# Patient Record
Sex: MALE | Race: WHITE | Employment: OTHER | URBAN - METROPOLITAN AREA
[De-identification: names, ages, dates, MRNs, and addresses within clinical notes are randomized per-mention and may not be internally consistent; named-entity substitution may affect disease eponyms.]

---

## 2019-12-18 PROBLEM — G47.33 OBSTRUCTIVE SLEEP APNEA: Status: ACTIVE | Noted: 2019-12-18

## 2020-03-06 ENCOUNTER — HOSPITAL ENCOUNTER (OUTPATIENT)
Dept: INFUSION THERAPY | Age: 65
Discharge: HOME | End: 2020-03-06
Payer: MEDICARE

## 2020-03-06 LAB
ALBUMIN SERPL-MCNC: 3.9 GM/DL (ref 3.4–5)
ALP BLD-CCNC: 62 IU/L (ref 40–129)
ALT SERPL-CCNC: 11 U/L (ref 10–40)
ANION GAP SERPL CALCULATED.3IONS-SCNC: 9 MMOL/L (ref 4–16)
AST SERPL-CCNC: 16 IU/L (ref 15–37)
BASOPHILS ABSOLUTE: 0 K/CU MM
BASOPHILS RELATIVE PERCENT: 0.7 % (ref 0–1)
BILIRUB SERPL-MCNC: 0.3 MG/DL (ref 0–1)
BUN BLDV-MCNC: 17 MG/DL (ref 6–23)
CALCIUM SERPL-MCNC: 8.9 MG/DL (ref 8.3–10.6)
CHLORIDE BLD-SCNC: 106 MMOL/L (ref 99–110)
CO2: 28 MMOL/L (ref 21–32)
CREAT SERPL-MCNC: 1.1 MG/DL (ref 0.9–1.3)
D DIMER: <200 NG/ML(DDU)
DIFFERENTIAL TYPE: ABNORMAL
EOSINOPHILS ABSOLUTE: 0.3 K/CU MM
EOSINOPHILS RELATIVE PERCENT: 4.8 % (ref 0–3)
GFR AFRICAN AMERICAN: >60 ML/MIN/1.73M2
GFR NON-AFRICAN AMERICAN: >60 ML/MIN/1.73M2
GLUCOSE BLD-MCNC: 73 MG/DL (ref 70–99)
HCT VFR BLD CALC: 43.2 % (ref 42–52)
HEMOGLOBIN: 14.3 GM/DL (ref 13.5–18)
LYMPHOCYTES ABSOLUTE: 1.4 K/CU MM
LYMPHOCYTES RELATIVE PERCENT: 25.6 % (ref 24–44)
MCH RBC QN AUTO: 29.4 PG (ref 27–31)
MCHC RBC AUTO-ENTMCNC: 33.1 % (ref 32–36)
MCV RBC AUTO: 88.9 FL (ref 78–100)
MONOCYTES ABSOLUTE: 0.6 K/CU MM
MONOCYTES RELATIVE PERCENT: 10.6 % (ref 0–4)
PDW BLD-RTO: 13.4 % (ref 11.7–14.9)
PLATELET # BLD: 202 K/CU MM (ref 140–440)
PMV BLD AUTO: 9.7 FL (ref 7.5–11.1)
POTASSIUM SERPL-SCNC: 4.7 MMOL/L (ref 3.5–5.1)
RBC # BLD: 4.86 M/CU MM (ref 4.6–6.2)
SEGMENTED NEUTROPHILS ABSOLUTE COUNT: 3.2 K/CU MM
SEGMENTED NEUTROPHILS RELATIVE PERCENT: 58.3 % (ref 36–66)
SODIUM BLD-SCNC: 143 MMOL/L (ref 135–145)
TOTAL PROTEIN: 6.7 GM/DL (ref 6.4–8.2)
WBC # BLD: 5.5 K/CU MM (ref 4–10.5)

## 2020-03-06 PROCEDURE — 36415 COLL VENOUS BLD VENIPUNCTURE: CPT

## 2020-03-06 PROCEDURE — 80053 COMPREHEN METABOLIC PANEL: CPT

## 2020-03-06 PROCEDURE — 85379 FIBRIN DEGRADATION QUANT: CPT

## 2020-03-06 PROCEDURE — 85025 COMPLETE CBC W/AUTO DIFF WBC: CPT

## 2020-06-21 ENCOUNTER — HOSPITAL ENCOUNTER (EMERGENCY)
Age: 65
Discharge: HOME | End: 2020-06-21
Payer: MEDICARE

## 2020-06-21 ENCOUNTER — APPOINTMENT (OUTPATIENT)
Dept: CT IMAGING | Age: 65
End: 2020-06-21
Payer: MEDICARE

## 2020-06-21 VITALS
TEMPERATURE: 98.4 F | SYSTOLIC BLOOD PRESSURE: 131 MMHG | HEIGHT: 73 IN | RESPIRATION RATE: 16 BRPM | OXYGEN SATURATION: 96 % | WEIGHT: 270 LBS | DIASTOLIC BLOOD PRESSURE: 86 MMHG | HEART RATE: 69 BPM | BODY MASS INDEX: 35.78 KG/M2

## 2020-06-21 LAB
ALBUMIN SERPL-MCNC: 4.1 GM/DL (ref 3.4–5)
ALP BLD-CCNC: 58 IU/L (ref 40–129)
ALT SERPL-CCNC: 19 U/L (ref 10–40)
ANION GAP SERPL CALCULATED.3IONS-SCNC: 9 MMOL/L (ref 4–16)
AST SERPL-CCNC: 25 IU/L (ref 15–37)
BACTERIA: NEGATIVE /HPF
BASOPHILS ABSOLUTE: 0.1 K/CU MM
BASOPHILS RELATIVE PERCENT: 0.8 % (ref 0–1)
BILIRUB SERPL-MCNC: 0.3 MG/DL (ref 0–1)
BILIRUBIN URINE: NEGATIVE MG/DL
BLOOD, URINE: NEGATIVE
BUN BLDV-MCNC: 26 MG/DL (ref 6–23)
CALCIUM SERPL-MCNC: 9.3 MG/DL (ref 8.3–10.6)
CHLORIDE BLD-SCNC: 104 MMOL/L (ref 99–110)
CLARITY: CLEAR
CO2: 26 MMOL/L (ref 21–32)
COLOR: YELLOW
CREAT SERPL-MCNC: 1.2 MG/DL (ref 0.9–1.3)
DIFFERENTIAL TYPE: ABNORMAL
EOSINOPHILS ABSOLUTE: 0.3 K/CU MM
EOSINOPHILS RELATIVE PERCENT: 4.7 % (ref 0–3)
GFR AFRICAN AMERICAN: >60 ML/MIN/1.73M2
GFR NON-AFRICAN AMERICAN: >60 ML/MIN/1.73M2
GLUCOSE BLD-MCNC: 99 MG/DL (ref 70–99)
GLUCOSE, URINE: NEGATIVE MG/DL
HCT VFR BLD CALC: 42 % (ref 42–52)
HEMOGLOBIN: 13.5 GM/DL (ref 13.5–18)
IMMATURE NEUTROPHIL %: 0.2 % (ref 0–0.43)
KETONES, URINE: NEGATIVE MG/DL
LACTATE: 0.9 MMOL/L (ref 0.4–2)
LEUKOCYTE ESTERASE, URINE: NEGATIVE
LIPASE: 46 IU/L (ref 13–60)
LYMPHOCYTES ABSOLUTE: 2 K/CU MM
LYMPHOCYTES RELATIVE PERCENT: 32 % (ref 24–44)
MCH RBC QN AUTO: 29.9 PG (ref 27–31)
MCHC RBC AUTO-ENTMCNC: 32.1 % (ref 32–36)
MCV RBC AUTO: 92.9 FL (ref 78–100)
MONOCYTES ABSOLUTE: 0.7 K/CU MM
MONOCYTES RELATIVE PERCENT: 11.8 % (ref 0–4)
NITRITE URINE, QUANTITATIVE: NEGATIVE
NUCLEATED RBC %: 0 %
PDW BLD-RTO: 13.2 % (ref 11.7–14.9)
PH, URINE: 5 (ref 5–8)
PLATELET # BLD: 200 K/CU MM (ref 140–440)
PMV BLD AUTO: 10 FL (ref 7.5–11.1)
POTASSIUM SERPL-SCNC: 4 MMOL/L (ref 3.5–5.1)
PROTEIN UA: NEGATIVE MG/DL
RBC # BLD: 4.52 M/CU MM (ref 4.6–6.2)
RBC URINE: NORMAL /HPF (ref 0–3)
SEGMENTED NEUTROPHILS ABSOLUTE COUNT: 3.1 K/CU MM
SEGMENTED NEUTROPHILS RELATIVE PERCENT: 50.5 % (ref 36–66)
SODIUM BLD-SCNC: 139 MMOL/L (ref 135–145)
SPECIFIC GRAVITY UA: 1.03 (ref 1–1.03)
SQUAMOUS EPITHELIAL: <1 /HPF
TOTAL IMMATURE NEUTOROPHIL: 0.01 K/CU MM
TOTAL NUCLEATED RBC: 0 K/CU MM
TOTAL PROTEIN: 7.2 GM/DL (ref 6.4–8.2)
TRICHOMONAS: NORMAL /HPF
UROBILINOGEN, URINE: 1 MG/DL (ref 0.2–1)
WBC # BLD: 6.1 K/CU MM (ref 4–10.5)
WBC UA: NORMAL /HPF (ref 0–2)

## 2020-06-21 PROCEDURE — 83690 ASSAY OF LIPASE: CPT

## 2020-06-21 PROCEDURE — 6360000004 HC RX CONTRAST MEDICATION: Performed by: PHYSICIAN ASSISTANT

## 2020-06-21 PROCEDURE — 2580000003 HC RX 258: Performed by: PHYSICIAN ASSISTANT

## 2020-06-21 PROCEDURE — 81001 URINALYSIS AUTO W/SCOPE: CPT

## 2020-06-21 PROCEDURE — 96374 THER/PROPH/DIAG INJ IV PUSH: CPT

## 2020-06-21 PROCEDURE — 96375 TX/PRO/DX INJ NEW DRUG ADDON: CPT

## 2020-06-21 PROCEDURE — 74177 CT ABD & PELVIS W/CONTRAST: CPT

## 2020-06-21 PROCEDURE — 6360000002 HC RX W HCPCS: Performed by: PHYSICIAN ASSISTANT

## 2020-06-21 PROCEDURE — 99284 EMERGENCY DEPT VISIT MOD MDM: CPT

## 2020-06-21 PROCEDURE — 85025 COMPLETE CBC W/AUTO DIFF WBC: CPT

## 2020-06-21 PROCEDURE — 83605 ASSAY OF LACTIC ACID: CPT

## 2020-06-21 PROCEDURE — 80053 COMPREHEN METABOLIC PANEL: CPT

## 2020-06-21 RX ORDER — SODIUM CHLORIDE 0.9 % (FLUSH) 0.9 %
10 SYRINGE (ML) INJECTION 2 TIMES DAILY
Status: DISCONTINUED | OUTPATIENT
Start: 2020-06-21 | End: 2020-06-22 | Stop reason: HOSPADM

## 2020-06-21 RX ORDER — ONDANSETRON 2 MG/ML
4 INJECTION INTRAMUSCULAR; INTRAVENOUS EVERY 30 MIN PRN
Status: DISCONTINUED | OUTPATIENT
Start: 2020-06-21 | End: 2020-06-22 | Stop reason: HOSPADM

## 2020-06-21 RX ORDER — SODIUM CHLORIDE 9 MG/ML
INJECTION, SOLUTION INTRAVENOUS CONTINUOUS
Status: DISCONTINUED | OUTPATIENT
Start: 2020-06-21 | End: 2020-06-22 | Stop reason: HOSPADM

## 2020-06-21 RX ORDER — MORPHINE SULFATE 4 MG/ML
4 INJECTION, SOLUTION INTRAMUSCULAR; INTRAVENOUS EVERY 30 MIN PRN
Status: DISCONTINUED | OUTPATIENT
Start: 2020-06-21 | End: 2020-06-22 | Stop reason: HOSPADM

## 2020-06-21 RX ADMIN — IOPAMIDOL 70 ML: 755 INJECTION, SOLUTION INTRAVENOUS at 23:30

## 2020-06-21 RX ADMIN — SODIUM CHLORIDE: 9 INJECTION, SOLUTION INTRAVENOUS at 20:52

## 2020-06-21 RX ADMIN — ONDANSETRON 4 MG: 2 INJECTION INTRAMUSCULAR; INTRAVENOUS at 21:02

## 2020-06-21 RX ADMIN — MORPHINE SULFATE 4 MG: 4 INJECTION, SOLUTION INTRAMUSCULAR; INTRAVENOUS at 21:03

## 2020-06-21 RX ADMIN — IOPAMIDOL 75 ML: 755 INJECTION, SOLUTION INTRAVENOUS at 21:48

## 2020-06-21 ASSESSMENT — PAIN SCALES - GENERAL
PAINLEVEL_OUTOF10: 3
PAINLEVEL_OUTOF10: 6
PAINLEVEL_OUTOF10: 6

## 2020-06-21 ASSESSMENT — PAIN DESCRIPTION - LOCATION: LOCATION: ABDOMEN

## 2020-06-21 ASSESSMENT — PAIN DESCRIPTION - PAIN TYPE: TYPE: ACUTE PAIN

## 2020-06-21 NOTE — ED PROVIDER NOTES
Absolute 3.1 K/CU MM    Lymphocytes Absolute 2.0 K/CU MM    Monocytes Absolute 0.7 K/CU MM    Eosinophils Absolute 0.3 K/CU MM    Basophils Absolute 0.1 K/CU MM    Nucleated RBC % 0.0 %    Total Nucleated RBC 0.0 K/CU MM    Total Immature Neutrophil 0.01 K/CU MM    Immature Neutrophil % 0.2 0 - 0.43 %   Comprehensive Metabolic Panel   Result Value Ref Range    Sodium 139 135 - 145 MMOL/L    Potassium 4.0 3.5 - 5.1 MMOL/L    Chloride 104 99 - 110 mMol/L    CO2 26 21 - 32 MMOL/L    BUN 26 (H) 6 - 23 MG/DL    CREATININE 1.2 0.9 - 1.3 MG/DL    Glucose 99 70 - 99 MG/DL    Calcium 9.3 8.3 - 10.6 MG/DL    Alb 4.1 3.4 - 5.0 GM/DL    Total Protein 7.2 6.4 - 8.2 GM/DL    Total Bilirubin 0.3 0.0 - 1.0 MG/DL    ALT 19 10 - 40 U/L    AST 25 15 - 37 IU/L    Alkaline Phosphatase 58 40 - 129 IU/L    GFR Non-African American >60 >60 mL/min/1.73m2    GFR African American >60 >60 mL/min/1.73m2    Anion Gap 9 4 - 16   Urinalysis   Result Value Ref Range    Color, UA YELLOW YELLOW    Clarity, UA CLEAR CLEAR    Glucose, Urine NEGATIVE NEGATIVE MG/DL    Bilirubin Urine NEGATIVE NEGATIVE MG/DL    Ketones, Urine NEGATIVE NEGATIVE MG/DL    Specific Gravity, UA 1.027 1.001 - 1.035    Blood, Urine NEGATIVE NEGATIVE    pH, Urine 5.0 5.0 - 8.0    Protein, UA NEGATIVE NEGATIVE MG/DL    Urobilinogen, Urine 1 0.2 - 1.0 MG/DL    Nitrite Urine, Quantitative NEGATIVE NEGATIVE    Leukocyte Esterase, Urine NEGATIVE NEGATIVE    RBC, UA NONE SEEN 0 - 3 /HPF    WBC, UA NONE SEEN 0 - 2 /HPF    Bacteria, UA NEGATIVE NEGATIVE /HPF    Squam Epithel, UA <1 /HPF    Trichomonas, UA NONE SEEN NONE SEEN /HPF   Lipase   Result Value Ref Range    Lipase 46 13 - 60 IU/L   Lactic Acid, Plasma   Result Value Ref Range    Lactate 0.9 0.4 - 2.0 mMOL/L      Radiographs (if obtained):  [] The following radiograph was interpreted by myself in the absence of a radiologist:   [] Radiologist's Report Reviewed:  CT ABDOMEN PELVIS W IV CONTRAST   Final Result   1.  Midline ventral the dictating provider for clarification.       Shirin Mckenzie, [address] [place] [address]  06/26/20 [id number]

## 2020-06-22 ENCOUNTER — CARE COORDINATION (OUTPATIENT)
Dept: CARE COORDINATION | Age: 65
End: 2020-06-22

## 2020-06-22 NOTE — CARE COORDINATION
Author provided contact information for future reference. Patient/family/caregiver given information for [employer] and agrees to enroll yes  Patient's preferred e-mail: [email or web address]   Patient's preferred phone number: [phone]   Based on Loop alert triggers, patient will be contacted by nurse care manager for worsening symptoms.

## 2020-06-23 RX ADMIN — IOPAMIDOL 70 ML: 612 INJECTION, SOLUTION INTRATHECAL at 13:55

## 2021-06-28 NOTE — PROGRESS NOTES
Patient Name: Saqib Dillon  Patient : 1955  Patient MRN: [id number]     Primary Oncologist: Tuan Hall MD  Referring Provider: Shanna Colvin DO     Date of Service: 2021      Chief Complaint:   Chief Complaint   Patient presents with    Follow-up     Patient Active Problem List:     Chest pain     Systolic CHF, chronic ([address]     CAD (coronary artery disease)     HTN (hypertension), benign     Unstable angina ([address]     Pulmonary emboli ([address]     Obstructive sleep apnea    HPI:   Mr. Beth Velasquez is a very pleasant 68-year-old gentleman with medical history significant for hypertension, non-obstructive coronary artery disease, congestive heart failure, angina, depression, history of prostate cancer originally presented to [facility] on 2015, with chest pain. He was found to have extensive bilateral pulmonary emboli, right greater than left involving the upper and the lower lobe, and segmental pulmonary arteries on CT angiogram of the chest done on 2015. The lower extremities Doppler was also done and it showed occlusive and non-occlusive DVT in the proximal left popliteal through common femoral veins. He received catheter-directed TPA on 2015. I was called at that time for evaluation of his venous thromboembolisms. He stated that his younger brother lost his legs because of blood clot in his 45s and one of his sisters has a blood clot in her eyes. No other family history of blood clot disease. Hypercoagulable workups done on 2015, showed mild elevation in the factor VIII activity (241), negative prothrombin gene mutation, negative factor V Leiden mutation, negative lupus anticoagulant, negative anticardiolipin antibodies, and negative Beta-2 glycoprotein antibodies. We plan to have protein-C, protein-S, and antithrombin III levels at the end of the anticoagulation therapy.        Since Mr. Beth Velasquez has unprovoked deep vein thrombosis, I discussed with him regarding long-term anticoagulation therapy. I discussed with Mr. Martin Chapin regarding changes in recommendations for venous thromboembolism lately. Current recommendation for unprovoked venous thromboembolism was long term anticoagulation therapy unless patient has contraindications. After long discussion, he is agreeable to continue with long-term anticoagulation therapy. On July 1, 2021, he presented to me for followup. I have been following Mr. Martin Chapin for pulmonary embolism and left lower extremity deep vein thrombosis. He is on long-term anticoagulation therapy with Coumadin since January, 2015 and he is tolerating it well. We discussed about stopping anticoagulation therapy. However he is really concerned about stopping it because of his sedentary life style. He is a  and he needs to drive 5-6 hour without stopping it. He prefers to continue with anticoagulation therapy since he is concerned about having recurrent venous thromboembolism because of hist sedentary lifestyle. He stated that he may be retiring in next few years. We will consider to stop [facility] therapy after [facility]. Since he prefers to continue with anticoagulation therapy and he does not have any side effects from anticoagulation therapy, I recommend him to continue with that for now. He is checking PT/INR regularly with cardiologist, Dr. Ynes Swan and he is adjusting the dose of coumadin accordingly. He does not have any significant symptoms on todays visit. Since he is on anticoagulation therapy, I will continue to follow him on a yearly basis. Health maintenance - I recommend him to have age appropriate cancer screening, exercise, low fat and low sodium diet. He doesn't have any other significant symptoms at today visit. PAST MEDICAL HISTORY:  Significant for:  1. Hypertension. 2.         Non-obstructive coronary artery disease.   3.         Congestive heart failure. 4.         Transmittal angina. 5.         Depression. 6.         History of prostate cancer. PAST SURGICAL HISTORY:  Significant for:  1. Hernia repair. 2.         Hydrocele excision. 3.         Pacemaker placement. 4.         Appendectomy. FAMILY HISTORY:  Significant for lung cancer in his father and breast cancer in his mother. No other family history of cancer disease. SOCIAL HISTORY:  He denies smoking, alcohol drinking, and illicit drug abuse. He is  and has four children and two step children. He is a retired . He is used to work as a  for 37 years and he retired from his job in 2012. ALLERGIES:  He claims to have allergies to Dilaudid, statins, and Imdur. Oncology History    No history exists. Review of Systems: \"Per interval history; otherwise 10 point ROS is negative. \"  His energy level is pretty good, appetite, and sleep are stable. He doesn't have fever, chills, night sweats, cough, shortness of breath, chest pain, hemoptysis, or palpitations. He denies neuropathy and he does not have bleeding issues. He is currently on Coumadin for history of pulmonary embolisms and left lower extremity deep vein thrombosis. He denies any pain in his body. His bowels and bladder functions are normal. He doesn't have nausea, vomiting, abdominal pain, diarrhea, constipation, dysuria, loss of appetite or weight loss. He denies any pain in his body. Denies anxiety or depression. The rest of the systems are unremarkable.      Vital Signs:  BP (!) 158/78 (Site: Right Upper Arm, Position: Sitting, Cuff Size: Large Adult)   Pulse 74   Temp 97.3 °F (36.3 °C) (Temporal)   Ht 6' 1\" (1.854 m)   Wt 290 lb 3.2 oz (131.6 kg)   SpO2 96%   BMI 38.29 kg/m²     Physical Exam:  CONSTITUTIONAL: awake, alert, cooperative, no apparent distress   EYES: pupils equal, round and reactive to light, sclera clear, normal conjunctiva  ENT: Normocephalic, without obvious abnormality, atraumatic  NECK: supple, symmetrical, no jugular venous distension, no carotid bruits   HEMATOLOGIC/LYMPHATIC: no cervical, supraclavicular or axillary lymphadenopathy   LUNGS: VBS, no wheezes, no crackles, no rhonchi, no increased work of breathing, clear to auscultation   CARDIOVASCULAR: regular rate and rhythm, normal S1 and S2, no murmur noted  ABDOMEN: normal bowel sounds x 4, soft, non-distended, non-tender, no masses palpated, no hepatosplenomegaly   MUSCULOSKELETAL: full range of motion noted, tone is normal  NEUROLOGIC: awake, alert, oriented to name, place and time. Motor skills grossly intact. SKIN: appears intact, normal skin color, normal texture, normal turgor, no jaundice.    EXTREMITIES: no LE edema, no leg swelling, no cyanosis, no clubbing     Labs:  Hematology:  Lab Results   Component Value Date    WBC 6.1 06/21/2020    RBC 4.52 (L) 06/21/2020    HGB 13.5 06/21/2020    HCT 42.0 06/21/2020    MCV 92.9 06/21/2020    MCH 29.9 06/21/2020    MCHC 32.1 06/21/2020    RDW 13.2 06/21/2020     06/21/2020    MPV 10.0 06/21/2020    SEGSPCT 50.5 06/21/2020    EOSRELPCT 4.7 (H) 06/21/2020    BASOPCT 0.8 06/21/2020    LYMPHOPCT 32.0 06/21/2020    MONOPCT 11.8 (H) 06/21/2020    SEGSABS 3.1 06/21/2020    EOSABS 0.3 06/21/2020    BASOSABS 0.1 06/21/2020    LYMPHSABS 2.0 06/21/2020    MONOSABS 0.7 06/21/2020    DIFFTYPE AUTOMATED DIFFERENTIAL 06/21/2020     No results found for: ESR  Chemistry:  Lab Results   Component Value Date     06/21/2020    K 4.0 06/21/2020     06/21/2020    CO2 26 06/21/2020    BUN 26 (H) 06/21/2020    CREATININE 1.2 06/21/2020    GLUCOSE 99 06/21/2020    CALCIUM 9.3 06/21/2020    PROT 7.2 06/21/2020    LABALBU 4.1 06/21/2020    BILITOT 0.3 06/21/2020    ALKPHOS 58 06/21/2020    AST 25 06/21/2020    ALT 19 06/21/2020    LABGLOM >60 06/21/2020    GFRAA >60 06/21/2020    MG 2.0 01/27/2015     No results found for: MMA, LDH, HOMOCYSTEINE  No components found for: LD  Lab Results   Component Value Date    TSHHS 0.425 (L) 05/19/2013    T4FREE 1.02 05/20/2013    FT3 2.6 05/20/2013     Immunology:  Lab Results   Component Value Date    PROT 7.2 06/21/2020     No results found for: Daniel Gregg, KLFLCR  No results found for: B2M  Coagulation Panel:  Lab Results   Component Value Date    PROTIME 89.2 (H) 02/02/2015    INR 8.32 (HH) 02/02/2015    APTT 37.9 (H) 01/28/2015    DDIMER <200 03/06/2020     Anemia Panel:  No results found for: Jennyfer Kathy, FOLATE  Tumor Markers:  No results found for: , CEA, , LABCA2, PSA  Observations:  PHQ-9 Total Score: 0 (7/1/2021  9:20 AM)        Assessment & Plan:   Unprovoked extensive PE and DVT. PLAN:  Mr. Tammy Cabrera has been followed for pulmonary embolism and left lower extremity deep vein thrombosis. On July 1, 2021, he presented to me for followup. I have been following Mr. Tammy Cabrera for pulmonary embolism and left lower extremity deep vein thrombosis. He is on long-term anticoagulation therapy with Coumadin since January, 2015 and he is tolerating it well. We discussed about stopping anticoagulation therapy. However he is really concerned about stopping it because of his sedentary life style. He is a  and he needs to drive 5-6 hour without stopping it. He prefers to continue with anticoagulation therapy since he is concerned about having recurrent venous thromboembolism because of hist sedentary lifestyle. He stated that he may be retiring in next few years. We will consider to stop [facility] therapy after long-term. Since he prefers to continue with anticoagulation therapy and he does not have any side effects from anticoagulation therapy, I recommend him to continue with that for now. He is checking PT/INR regularly with cardiologist, Dr. Deanna Torres and he is adjusting the dose of coumadin accordingly. He does not have any significant symptoms on todays visit.   Since he is on anticoagulation therapy, I will continue to follow him on a yearly basis. Health maintenance - I recommend him to have age appropriate cancer screening, exercise, low fat and low sodium diet. I answered all his questions and concerns for today. I asked him to followup with primary care physician and his Cardiologist on regular basis. Recent imaging and labs were reviewed and discussed with the patient.

## 2021-07-01 ENCOUNTER — OFFICE VISIT (OUTPATIENT)
Dept: ONCOLOGY | Age: 66
End: 2021-07-01
Payer: MEDICARE

## 2021-07-01 ENCOUNTER — HOSPITAL ENCOUNTER (OUTPATIENT)
Dept: INFUSION THERAPY | Age: 66
Discharge: HOME | End: 2021-07-01
Payer: MEDICARE

## 2021-07-01 VITALS
WEIGHT: 290.2 LBS | HEART RATE: 74 BPM | HEIGHT: 73 IN | TEMPERATURE: 97.3 F | BODY MASS INDEX: 38.46 KG/M2 | SYSTOLIC BLOOD PRESSURE: 158 MMHG | DIASTOLIC BLOOD PRESSURE: 78 MMHG | OXYGEN SATURATION: 96 %

## 2021-07-01 DIAGNOSIS — I26.99 OTHER PULMONARY EMBOLISM WITHOUT ACUTE COR PULMONALE, UNSPECIFIED CHRONICITY (HCC): Primary | ICD-10-CM

## 2021-07-01 PROCEDURE — 1036F TOBACCO NON-USER: CPT | Performed by: INTERNAL MEDICINE

## 2021-07-01 PROCEDURE — 99211 OFF/OP EST MAY X REQ PHY/QHP: CPT

## 2021-07-01 PROCEDURE — G8417 CALC BMI ABV UP PARAM F/U: HCPCS | Performed by: INTERNAL MEDICINE

## 2021-07-01 PROCEDURE — 4040F PNEUMOC VAC/ADMIN/RCVD: CPT | Performed by: INTERNAL MEDICINE

## 2021-07-01 PROCEDURE — G8427 DOCREV CUR MEDS BY ELIG CLIN: HCPCS | Performed by: INTERNAL MEDICINE

## 2021-07-01 PROCEDURE — 1123F ACP DISCUSS/DSCN MKR DOCD: CPT | Performed by: INTERNAL MEDICINE

## 2021-07-01 PROCEDURE — 3017F COLORECTAL CA SCREEN DOC REV: CPT | Performed by: INTERNAL MEDICINE

## 2021-07-01 PROCEDURE — 99213 OFFICE O/P EST LOW 20 MIN: CPT | Performed by: INTERNAL MEDICINE

## 2021-07-01 ASSESSMENT — PATIENT HEALTH QUESTIONNAIRE - PHQ9
SUM OF ALL RESPONSES TO PHQ QUESTIONS 1-9: 0
2. FEELING DOWN, DEPRESSED OR HOPELESS: 0
SUM OF ALL RESPONSES TO PHQ QUESTIONS 1-9: 0
1. LITTLE INTEREST OR PLEASURE IN DOING THINGS: 0
SUM OF ALL RESPONSES TO PHQ9 QUESTIONS 1 & 2: 0
SUM OF ALL RESPONSES TO PHQ QUESTIONS 1-9: 0

## 2021-07-01 NOTE — PROGRESS NOTES
MA Rooming Questions  Patient: Porfirio Perez  MRN: [id number]    Date: 7/1/2021        1. Do you have any new issues?   no         2. Do you need any refills on medications?    no    3. Have you had any imaging done since your last visit?   no    4. Have you been hospitalized or seen in the emergency room since your last visit here?   no    5. Did the patient have a depression screening completed today?  Yes    PHQ-9 Total Score: 0 (7/1/2021  9:20 AM)       PHQ-9 Given to (if applicable):               PHQ-9 Score (if applicable):                     [] Positive     []  Negative              Does question #9 need addressed (if applicable)                     [] Yes    []  No               Rosalia Essex, CMA

## 2022-07-21 ENCOUNTER — OFFICE VISIT (OUTPATIENT)
Dept: ONCOLOGY | Age: 67
End: 2022-07-21
Payer: MEDICARE

## 2022-07-21 ENCOUNTER — HOSPITAL ENCOUNTER (OUTPATIENT)
Dept: INFUSION THERAPY | Age: 67
End: 2022-07-21

## 2022-07-21 VITALS
DIASTOLIC BLOOD PRESSURE: 74 MMHG | OXYGEN SATURATION: 97 % | TEMPERATURE: 98.2 F | HEIGHT: 73 IN | RESPIRATION RATE: 18 BRPM | WEIGHT: 296.6 LBS | BODY MASS INDEX: 39.31 KG/M2 | HEART RATE: 80 BPM | SYSTOLIC BLOOD PRESSURE: 155 MMHG

## 2022-07-21 DIAGNOSIS — I26.99 OTHER PULMONARY EMBOLISM WITHOUT ACUTE COR PULMONALE, UNSPECIFIED CHRONICITY (HCC): Primary | ICD-10-CM

## 2022-07-21 PROCEDURE — 1123F ACP DISCUSS/DSCN MKR DOCD: CPT | Performed by: INTERNAL MEDICINE

## 2022-07-21 PROCEDURE — G8427 DOCREV CUR MEDS BY ELIG CLIN: HCPCS | Performed by: INTERNAL MEDICINE

## 2022-07-21 PROCEDURE — 99213 OFFICE O/P EST LOW 20 MIN: CPT | Performed by: INTERNAL MEDICINE

## 2022-07-21 PROCEDURE — 1036F TOBACCO NON-USER: CPT | Performed by: INTERNAL MEDICINE

## 2022-07-21 PROCEDURE — G8419 CALC BMI OUT NRM PARAM NOF/U: HCPCS | Performed by: INTERNAL MEDICINE

## 2022-07-21 PROCEDURE — 3017F COLORECTAL CA SCREEN DOC REV: CPT | Performed by: INTERNAL MEDICINE

## 2022-07-21 NOTE — PROGRESS NOTES
Patient Name: Lillie Rosa  Patient : 1955  Patient MRN: [id number]     Primary Oncologist: Darrion Aggarwal MD  Referring Provider: Shwetha Galarza DO     Date of Service: 2022      Chief Complaint:   Chief Complaint   Patient presents with    Follow-up       Patient Active Problem List:     Chest pain     Systolic CHF, chronic ([address]     CAD (coronary artery disease)     HTN (hypertension), benign     Unstable angina ([address]     Pulmonary emboli ([address]     Obstructive sleep apnea    HPI:   Mr. Lizette Melendez is a very pleasant 77-year-old gentleman with medical history significant for hypertension, non-obstructive coronary artery disease, congestive heart failure, angina, depression, history of prostate cancer originally presented to [facility] on 2015, with chest pain. He was found to have extensive bilateral pulmonary emboli, right greater than left involving the upper and the lower lobe, and segmental pulmonary arteries on CT angiogram of the chest done on 2015. The lower extremities Doppler was also done and it showed occlusive and non-occlusive DVT in the proximal left popliteal through common femoral veins. He received catheter-directed TPA on 2015. I was called at that time for evaluation of his venous thromboembolisms. He stated that his younger brother lost his legs because of blood clot in his 45s and one of his sisters has a blood clot in her eyes. No other family history of blood clot disease. Hypercoagulable workups done on 2015, showed mild elevation in the factor VIII activity (241), negative prothrombin gene mutation, negative factor V Leiden mutation, negative lupus anticoagulant, negative anticardiolipin antibodies, and negative Beta-2 glycoprotein antibodies. We plan to have protein-C, protein-S, and antithrombin III levels at the end of the anticoagulation therapy.        Since Mr. Lizette Melendez has unprovoked deep vein thrombosis, I discussed with him regarding long-term anticoagulation therapy. I discussed with Mr. Shonna Krishnan regarding changes in recommendations for venous thromboembolism lately. Current recommendation for unprovoked venous thromboembolism was long term anticoagulation therapy unless patient has contraindications. After long discussion, he is agreeable to continue with long-term anticoagulation therapy. On July 21, 2022, he presented to me for followup. I have been following Mr. Shonna Krishnan for pulmonary embolism and left lower extremity deep vein thrombosis. He is on long-term anticoagulation therapy with Coumadin since January, 2015 and he is tolerating it well. We discussed about stopping anticoagulation therapy. However he is really concerned about stopping it because of his sedentary life style. He is a  and he needs to drive 5-6 hour without stopping it. He prefers to continue with anticoagulation therapy since he is concerned about having recurrent venous thromboembolism because of hist sedentary lifestyle. He stated that he may be retiring in next few years. We will consider to stop [facility] [place] therapy after assisted. Since he prefers to continue with anticoagulation therapy and he does not have any side effects from anticoagulation therapy, I recommend him to continue with that for now. He is checking PT/INR regularly with cardiologist, Dr. June Stoner and he is adjusting the dose of coumadin accordingly. He does not have any significant symptoms on todays visit. Since he is on anticoagulation therapy, I will continue to follow him on a yearly basis. I reviewed his labs done on 7/20/22. Health maintenance - I recommend him to have age appropriate cancer screening, exercise, low fat and low sodium diet. He doesn't have any other significant symptoms at today visit. PAST MEDICAL HISTORY:  Significant for:  1. Hypertension.   2.         Non-obstructive coronary artery disease. 3.         Congestive heart failure. 4.         Transmittal angina. 5.         Depression. 6.         History of prostate cancer. PAST SURGICAL HISTORY:  Significant for:  1. Hernia repair. 2.         Hydrocele excision. 3.         Pacemaker placement. 4.         Appendectomy. FAMILY HISTORY:  Significant for lung cancer in his father and breast cancer in his mother. No other family history of cancer disease. SOCIAL HISTORY:  He denies smoking, alcohol drinking, and illicit drug abuse. He is  and has four children and two step children. He is a retired . He is used to work as a  for 37 years and he retired from his job in 2012. ALLERGIES:  He claims to have allergies to Dilaudid, statins, and Imdur. Oncology History    No history exists. Review of Systems: \"Per interval history; otherwise 10 point ROS is negative. \"  His energy level is good and his sleep is fine. He doesn't have fever, chills, night sweats, cough, shortness of breath, chest pain, hemoptysis, or palpitations. He denies neuropathy and he does not have bleeding issues. He is currently on Coumadin for history of pulmonary embolisms and left lower extremity deep vein thrombosis. He denies any pain in his body. His bowels and bladder functions are normal. He doesn't have nausea, vomiting, abdominal pain, diarrhea, constipation, dysuria, loss of appetite or weight loss. He denies any pain in his body. Denies anxiety or depression. The rest of the systems are unremarkable.      Vital Signs:  BP (!) 155/74 (Site: Right Upper Arm, Position: Sitting, Cuff Size: Large Adult)   Pulse 80   Temp 98.2 °F (36.8 °C) (Temporal)   Resp 18   Ht 6' 1\" (1.854 m)   Wt 296 lb 9.6 oz (134.5 kg)   SpO2 97%   BMI 39.13 kg/m²     Physical Exam:  CONSTITUTIONAL: awake, alert, cooperative, no apparent distress   EYES: pupils equal, round and reactive to light, sclera clear, normal conjunctiva  ENT: Normocephalic, without obvious abnormality, atraumatic  NECK: supple, symmetrical, no jugular venous distension, no carotid bruits   HEMATOLOGIC/LYMPHATIC: no cervical, supraclavicular or axillary lymphadenopathy   LUNGS: VBS, no wheezes, no crackles, no rhonchi, no increased work of breathing, clear to auscultation   CARDIOVASCULAR: regular rate and rhythm, normal S1 and S2, no murmur noted  ABDOMEN: normal bowel sounds x 4, soft, non-distended, non-tender, no masses palpated, no hepatosplenomegaly   MUSCULOSKELETAL: full range of motion noted, tone is normal  NEUROLOGIC: awake, alert, oriented to name, place and time. Motor skills grossly intact. SKIN: appears intact, normal skin color, normal texture, normal turgor, no jaundice.    EXTREMITIES: no LE edema, no clubbing, no leg swelling, no cyanosis,      Labs:  Hematology:  Lab Results   Component Value Date    WBC 6.1 06/21/2020    RBC 4.52 (L) 06/21/2020    HGB 13.5 06/21/2020    HCT 42.0 06/21/2020    MCV 92.9 06/21/2020    MCH 29.9 06/21/2020    MCHC 32.1 06/21/2020    RDW 13.2 06/21/2020     06/21/2020    MPV 10.0 06/21/2020    SEGSPCT 50.5 06/21/2020    EOSRELPCT 4.7 (H) 06/21/2020    BASOPCT 0.8 06/21/2020    LYMPHOPCT 32.0 06/21/2020    MONOPCT 11.8 (H) 06/21/2020    SEGSABS 3.1 06/21/2020    EOSABS 0.3 06/21/2020    BASOSABS 0.1 06/21/2020    LYMPHSABS 2.0 06/21/2020    MONOSABS 0.7 06/21/2020    DIFFTYPE AUTOMATED DIFFERENTIAL 06/21/2020     No results found for: ESR  Chemistry:  Lab Results   Component Value Date     06/21/2020    K 4.0 06/21/2020     06/21/2020    CO2 26 06/21/2020    BUN 26 (H) 06/21/2020    CREATININE 1.2 06/21/2020    GLUCOSE 99 06/21/2020    CALCIUM 9.3 06/21/2020    PROT 7.2 06/21/2020    LABALBU 4.1 06/21/2020    BILITOT 0.3 06/21/2020    ALKPHOS 58 06/21/2020    AST 25 06/21/2020    ALT 19 06/21/2020    LABGLOM >60 06/21/2020    GFRAA >60 06/21/2020    MG 2.0 01/27/2015     No results found for: MMA, LDH, HOMOCYSTEINE  No components found for: LD  Lab Results   Component Value Date    TSHHS 0.425 (L) 05/19/2013    T4FREE 1.02 05/20/2013    FT3 2.6 05/20/2013     Immunology:  Lab Results   Component Value Date    PROT 7.2 06/21/2020     No results found for: Sánchez Gomez, KLFLCR  No results found for: B2M  Coagulation Panel:  Lab Results   Component Value Date    PROTIME 89.2 (H) 02/02/2015    INR 8.32 (HH) 02/02/2015    APTT 37.9 (H) 01/28/2015    DDIMER <200 03/06/2020     Anemia Panel:  No results found for: [id number], FOLATE  Tumor Markers:  No results found for: , CEA, , LABCA2, PSA  Observations:  No data recorded        Assessment & Plan:   Unprovoked extensive PE and DVT. PLAN:  Mr. Deborah Richardson has been followed for pulmonary embolism and left lower extremity deep vein thrombosis. On July 21, 2022, he presented to me for followup. I have been following Mr. Deborah Richardson for pulmonary embolism and left lower extremity deep vein thrombosis. He is on long-term anticoagulation therapy with Coumadin since January, 2015 and he is tolerating it well. We discussed about stopping anticoagulation therapy. However he is really concerned about stopping it because of his sedentary life style. He is a  and he needs to drive 5-6 hour without stopping it. He prefers to continue with anticoagulation therapy since he is concerned about having recurrent venous thromboembolism because of hist sedentary lifestyle. He stated that he may be retiring in next few years. We will consider to stop [facility] therapy after assisted. Since he prefers to continue with anticoagulation therapy and he does not have any side effects from anticoagulation therapy, I recommend him to continue with that for now. He is checking PT/INR regularly with cardiologist, Dr. Nurys Murillo and he is adjusting the dose of coumadin accordingly. He does not have any significant symptoms on todays visit.   Since he is on anticoagulation therapy, I will continue to follow him on a yearly basis. I reviewed his labs done on 7/20/22. Health maintenance - I recommend him to have age appropriate cancer screening, exercise, low fat and low sodium diet. I answered all his questions and concerns for today. Recent imaging and labs were reviewed and discussed with the patient.

## 2022-07-21 NOTE — PROGRESS NOTES
[address] [facility] Rooming Questions  Patient: Trina Baird  MRN: [id number]    Date: 7/21/2022        1. Do you have any new issues?   no         2. Do you need any refills on medications?    no    3. Have you had any imaging done since your last visit?   no    4. Have you been hospitalized or seen in the emergency room since your last visit here?   no    5. Did the patient have a depression screening completed today?  No    No data recorded     PHQ-9 Given to (if applicable):               PHQ-9 Score (if applicable):                     [] Positive     []  Negative              Does question #9 need addressed (if applicable)                     [] Yes    []  No               Chastity Fisher CMA

## 2022-12-12 ENCOUNTER — APPOINTMENT (OUTPATIENT)
Dept: GENERAL RADIOLOGY | Age: 67
End: 2022-12-12
Payer: MEDICARE

## 2022-12-12 ENCOUNTER — APPOINTMENT (OUTPATIENT)
Dept: CT IMAGING | Age: 67
End: 2022-12-12
Payer: MEDICARE

## 2022-12-12 ENCOUNTER — HOSPITAL ENCOUNTER (OUTPATIENT)
Age: 67
Setting detail: OBSERVATION
Discharge: HOME | End: 2022-12-12
Attending: EMERGENCY MEDICINE
Payer: MEDICARE

## 2022-12-12 VITALS
BODY MASS INDEX: 38.7 KG/M2 | SYSTOLIC BLOOD PRESSURE: 122 MMHG | WEIGHT: 292 LBS | OXYGEN SATURATION: 97 % | HEIGHT: 73 IN | RESPIRATION RATE: 14 BRPM | TEMPERATURE: 97.9 F | HEART RATE: 70 BPM | DIASTOLIC BLOOD PRESSURE: 78 MMHG

## 2022-12-12 DIAGNOSIS — R07.9 CHEST PAIN, UNSPECIFIED TYPE: ICD-10-CM

## 2022-12-12 DIAGNOSIS — R42 VERTIGO: Primary | ICD-10-CM

## 2022-12-12 LAB
ALBUMIN SERPL-MCNC: 4 GM/DL (ref 3.4–5)
ALP BLD-CCNC: 54 IU/L (ref 40–128)
ALT SERPL-CCNC: 26 U/L (ref 10–40)
ANION GAP SERPL CALCULATED.3IONS-SCNC: 10 MMOL/L (ref 4–16)
AST SERPL-CCNC: 26 IU/L (ref 15–37)
BASOPHILS ABSOLUTE: 0 K/CU MM
BASOPHILS RELATIVE PERCENT: 0.5 % (ref 0–1)
BILIRUB SERPL-MCNC: 0.3 MG/DL (ref 0–1)
BUN BLDV-MCNC: 17 MG/DL (ref 6–23)
CALCIUM SERPL-MCNC: 8.8 MG/DL (ref 8.3–10.6)
CHLORIDE BLD-SCNC: 103 MMOL/L (ref 99–110)
CHOLESTEROL: 192 MG/DL
CO2: 26 MMOL/L (ref 21–32)
CREAT SERPL-MCNC: 1.1 MG/DL (ref 0.9–1.3)
DIFFERENTIAL TYPE: ABNORMAL
EOSINOPHILS ABSOLUTE: 0.3 K/CU MM
EOSINOPHILS RELATIVE PERCENT: 4.4 % (ref 0–3)
GFR SERPL CREATININE-BSD FRML MDRD: >60 ML/MIN/1.73M2
GLUCOSE BLD-MCNC: 123 MG/DL (ref 70–99)
HCT VFR BLD CALC: 41.7 % (ref 42–52)
HDLC SERPL-MCNC: 39 MG/DL
HEMOGLOBIN: 13.7 GM/DL (ref 13.5–18)
IMMATURE NEUTROPHIL %: 0.3 % (ref 0–0.43)
INR BLD: 1.59 INDEX
LDL CHOLESTEROL CALCULATED: 127 MG/DL
LYMPHOCYTES ABSOLUTE: 1.6 K/CU MM
LYMPHOCYTES RELATIVE PERCENT: 26.8 % (ref 24–44)
MCH RBC QN AUTO: 29 PG (ref 27–31)
MCHC RBC AUTO-ENTMCNC: 32.9 % (ref 32–36)
MCV RBC AUTO: 88.3 FL (ref 78–100)
MONOCYTES ABSOLUTE: 0.6 K/CU MM
MONOCYTES RELATIVE PERCENT: 9.5 % (ref 0–4)
NUCLEATED RBC %: 0 %
PDW BLD-RTO: 12.5 % (ref 11.7–14.9)
PLATELET # BLD: 209 K/CU MM (ref 140–440)
PMV BLD AUTO: 10.2 FL (ref 7.5–11.1)
POTASSIUM SERPL-SCNC: 4 MMOL/L (ref 3.5–5.1)
PROTHROMBIN TIME: 20.6 SECONDS (ref 11.7–14.5)
RBC # BLD: 4.72 M/CU MM (ref 4.6–6.2)
SEGMENTED NEUTROPHILS ABSOLUTE COUNT: 3.6 K/CU MM
SEGMENTED NEUTROPHILS RELATIVE PERCENT: 58.5 % (ref 36–66)
SODIUM BLD-SCNC: 139 MMOL/L (ref 135–145)
T4 FREE: 0.96 NG/DL (ref 0.9–1.8)
TOTAL IMMATURE NEUTOROPHIL: 0.02 K/CU MM
TOTAL NUCLEATED RBC: 0 K/CU MM
TOTAL PROTEIN: 6.9 GM/DL (ref 6.4–8.2)
TRIGL SERPL-MCNC: 129 MG/DL
TROPONIN T: <0.01 NG/ML
TROPONIN T: <0.01 NG/ML
TSH HIGH SENSITIVITY: 2.1 UIU/ML (ref 0.27–4.2)
WBC # BLD: 6.1 K/CU MM (ref 4–10.5)

## 2022-12-12 PROCEDURE — 85025 COMPLETE CBC W/AUTO DIFF WBC: CPT

## 2022-12-12 PROCEDURE — 6370000000 HC RX 637 (ALT 250 FOR IP): Performed by: STUDENT IN AN ORGANIZED HEALTH CARE EDUCATION/TRAINING PROGRAM

## 2022-12-12 PROCEDURE — 96374 THER/PROPH/DIAG INJ IV PUSH: CPT

## 2022-12-12 PROCEDURE — 36415 COLL VENOUS BLD VENIPUNCTURE: CPT

## 2022-12-12 PROCEDURE — 84443 ASSAY THYROID STIM HORMONE: CPT

## 2022-12-12 PROCEDURE — G0378 HOSPITAL OBSERVATION PER HR: HCPCS

## 2022-12-12 PROCEDURE — 6370000000 HC RX 637 (ALT 250 FOR IP): Performed by: EMERGENCY MEDICINE

## 2022-12-12 PROCEDURE — 84484 ASSAY OF TROPONIN QUANT: CPT

## 2022-12-12 PROCEDURE — 71045 X-RAY EXAM CHEST 1 VIEW: CPT

## 2022-12-12 PROCEDURE — 80053 COMPREHEN METABOLIC PANEL: CPT

## 2022-12-12 PROCEDURE — 70450 CT HEAD/BRAIN W/O DYE: CPT

## 2022-12-12 PROCEDURE — 84439 ASSAY OF FREE THYROXINE: CPT

## 2022-12-12 PROCEDURE — 6370000000 HC RX 637 (ALT 250 FOR IP)

## 2022-12-12 PROCEDURE — 6360000002 HC RX W HCPCS: Performed by: EMERGENCY MEDICINE

## 2022-12-12 PROCEDURE — 94761 N-INVAS EAR/PLS OXIMETRY MLT: CPT

## 2022-12-12 PROCEDURE — 85610 PROTHROMBIN TIME: CPT

## 2022-12-12 PROCEDURE — 2580000003 HC RX 258: Performed by: STUDENT IN AN ORGANIZED HEALTH CARE EDUCATION/TRAINING PROGRAM

## 2022-12-12 PROCEDURE — 93306 TTE W/DOPPLER COMPLETE: CPT

## 2022-12-12 PROCEDURE — 80061 LIPID PANEL: CPT

## 2022-12-12 PROCEDURE — 93005 ELECTROCARDIOGRAM TRACING: CPT | Performed by: EMERGENCY MEDICINE

## 2022-12-12 PROCEDURE — 99285 EMERGENCY DEPT VISIT HI MDM: CPT

## 2022-12-12 RX ORDER — PANTOPRAZOLE SODIUM 40 MG/1
40 TABLET, DELAYED RELEASE ORAL DAILY
Qty: 30 TABLET | Refills: 3
Start: 2022-12-12

## 2022-12-12 RX ORDER — SODIUM CHLORIDE 0.9 % (FLUSH) 0.9 %
5-40 SYRINGE (ML) INJECTION EVERY 12 HOURS SCHEDULED
Status: DISCONTINUED | OUTPATIENT
Start: 2022-12-12 | End: 2022-12-12 | Stop reason: HOSPADM

## 2022-12-12 RX ORDER — LOSARTAN POTASSIUM 100 MG/1
100 TABLET ORAL DAILY
Qty: 30 TABLET | Refills: 3 | Status: SHIPPED | OUTPATIENT
Start: 2022-12-13 | End: 2022-12-12 | Stop reason: SDUPTHER

## 2022-12-12 RX ORDER — WARFARIN SODIUM 2 MG/1
2 TABLET ORAL DAILY
COMMUNITY

## 2022-12-12 RX ORDER — POLYETHYLENE GLYCOL 3350 17 G/17G
17 POWDER, FOR SOLUTION ORAL DAILY PRN
Status: DISCONTINUED | OUTPATIENT
Start: 2022-12-12 | End: 2022-12-12 | Stop reason: HOSPADM

## 2022-12-12 RX ORDER — LOSARTAN POTASSIUM 100 MG/1
100 TABLET ORAL DAILY
Qty: 30 TABLET | Refills: 3 | Status: SHIPPED | OUTPATIENT
Start: 2022-12-13

## 2022-12-12 RX ORDER — SODIUM CHLORIDE 0.9 % (FLUSH) 0.9 %
5-40 SYRINGE (ML) INJECTION PRN
Status: DISCONTINUED | OUTPATIENT
Start: 2022-12-12 | End: 2022-12-12 | Stop reason: HOSPADM

## 2022-12-12 RX ORDER — WARFARIN SODIUM 5 MG/1
5 TABLET ORAL DAILY
Status: DISCONTINUED | OUTPATIENT
Start: 2022-12-12 | End: 2022-12-12

## 2022-12-12 RX ORDER — METOPROLOL TARTRATE 50 MG/1
50 TABLET, FILM COATED ORAL 2 TIMES DAILY
Qty: 60 TABLET | Refills: 3 | Status: SHIPPED | OUTPATIENT
Start: 2022-12-12

## 2022-12-12 RX ORDER — PANTOPRAZOLE SODIUM 40 MG/1
40 TABLET, DELAYED RELEASE ORAL DAILY
Status: DISCONTINUED | OUTPATIENT
Start: 2022-12-12 | End: 2022-12-12 | Stop reason: HOSPADM

## 2022-12-12 RX ORDER — LOSARTAN POTASSIUM 100 MG/1
100 TABLET ORAL DAILY
Status: DISCONTINUED | OUTPATIENT
Start: 2022-12-12 | End: 2022-12-12 | Stop reason: HOSPADM

## 2022-12-12 RX ORDER — LOSARTAN POTASSIUM 100 MG/1
100 TABLET ORAL DAILY
Qty: 30 TABLET | Refills: 3 | OUTPATIENT
Start: 2022-12-13 | End: 2022-12-12 | Stop reason: SDUPTHER

## 2022-12-12 RX ORDER — FINASTERIDE 5 MG/1
5 TABLET, FILM COATED ORAL DAILY
Status: DISCONTINUED | OUTPATIENT
Start: 2022-12-12 | End: 2022-12-12 | Stop reason: HOSPADM

## 2022-12-12 RX ORDER — ASPIRIN 81 MG/1
81 TABLET, CHEWABLE ORAL DAILY
Status: DISCONTINUED | OUTPATIENT
Start: 2022-12-12 | End: 2022-12-12 | Stop reason: HOSPADM

## 2022-12-12 RX ORDER — OLMESARTAN MEDOXOMIL 40 MG/1
40 TABLET ORAL DAILY
Status: DISCONTINUED | OUTPATIENT
Start: 2022-12-12 | End: 2022-12-12 | Stop reason: CLARIF

## 2022-12-12 RX ORDER — WARFARIN SODIUM 2 MG/1
2 TABLET ORAL DAILY
Status: DISCONTINUED | OUTPATIENT
Start: 2022-12-13 | End: 2022-12-12 | Stop reason: HOSPADM

## 2022-12-12 RX ORDER — ONDANSETRON 4 MG/1
4 TABLET, ORALLY DISINTEGRATING ORAL EVERY 8 HOURS PRN
Status: DISCONTINUED | OUTPATIENT
Start: 2022-12-12 | End: 2022-12-12 | Stop reason: HOSPADM

## 2022-12-12 RX ORDER — MECLIZINE HYDROCHLORIDE 25 MG/1
25 TABLET ORAL ONCE
Status: COMPLETED | OUTPATIENT
Start: 2022-12-12 | End: 2022-12-12

## 2022-12-12 RX ORDER — ACETAMINOPHEN 650 MG/1
650 SUPPOSITORY RECTAL EVERY 6 HOURS PRN
Status: DISCONTINUED | OUTPATIENT
Start: 2022-12-12 | End: 2022-12-12 | Stop reason: HOSPADM

## 2022-12-12 RX ORDER — ONDANSETRON 2 MG/ML
8 INJECTION INTRAMUSCULAR; INTRAVENOUS ONCE
Status: COMPLETED | OUTPATIENT
Start: 2022-12-12 | End: 2022-12-12

## 2022-12-12 RX ORDER — WARFARIN SODIUM 3 MG/1
3 TABLET ORAL
Status: COMPLETED | OUTPATIENT
Start: 2022-12-12 | End: 2022-12-12

## 2022-12-12 RX ORDER — ENOXAPARIN SODIUM 100 MG/ML
40 INJECTION SUBCUTANEOUS DAILY
Status: DISCONTINUED | OUTPATIENT
Start: 2022-12-12 | End: 2022-12-12

## 2022-12-12 RX ORDER — METOPROLOL TARTRATE 50 MG/1
50 TABLET, FILM COATED ORAL 2 TIMES DAILY
Status: DISCONTINUED | OUTPATIENT
Start: 2022-12-12 | End: 2022-12-12 | Stop reason: HOSPADM

## 2022-12-12 RX ORDER — ONDANSETRON 2 MG/ML
4 INJECTION INTRAMUSCULAR; INTRAVENOUS EVERY 6 HOURS PRN
Status: DISCONTINUED | OUTPATIENT
Start: 2022-12-12 | End: 2022-12-12 | Stop reason: HOSPADM

## 2022-12-12 RX ORDER — ACETAMINOPHEN 325 MG/1
650 TABLET ORAL EVERY 6 HOURS PRN
Status: DISCONTINUED | OUTPATIENT
Start: 2022-12-12 | End: 2022-12-12 | Stop reason: HOSPADM

## 2022-12-12 RX ORDER — SODIUM CHLORIDE 9 MG/ML
INJECTION, SOLUTION INTRAVENOUS PRN
Status: DISCONTINUED | OUTPATIENT
Start: 2022-12-12 | End: 2022-12-12 | Stop reason: HOSPADM

## 2022-12-12 RX ADMIN — PANTOPRAZOLE SODIUM 40 MG: 40 TABLET, DELAYED RELEASE ORAL at 05:20

## 2022-12-12 RX ADMIN — FINASTERIDE 5 MG: 5 TABLET, FILM COATED ORAL at 08:32

## 2022-12-12 RX ADMIN — SODIUM CHLORIDE, PRESERVATIVE FREE 10 ML: 5 INJECTION INTRAVENOUS at 08:32

## 2022-12-12 RX ADMIN — LOSARTAN POTASSIUM 100 MG: 100 TABLET, FILM COATED ORAL at 08:32

## 2022-12-12 RX ADMIN — ONDANSETRON 8 MG: 2 INJECTION INTRAMUSCULAR; INTRAVENOUS at 02:17

## 2022-12-12 RX ADMIN — WARFARIN SODIUM 3 MG: 3 TABLET ORAL at 17:12

## 2022-12-12 RX ADMIN — MECLIZINE HYDROCHLORIDE 25 MG: 25 TABLET ORAL at 02:16

## 2022-12-12 RX ADMIN — ASPIRIN 81 MG: 81 TABLET, CHEWABLE ORAL at 08:32

## 2022-12-12 ASSESSMENT — PAIN SCALES - GENERAL
PAINLEVEL_OUTOF10: 3
PAINLEVEL_OUTOF10: 0

## 2022-12-12 ASSESSMENT — PAIN DESCRIPTION - DESCRIPTORS: DESCRIPTORS: ACHING

## 2022-12-12 ASSESSMENT — PAIN DESCRIPTION - LOCATION: LOCATION: HEAD

## 2022-12-12 NOTE — CONSULTS
PHARMACY ANTICOAGULATION MONITORING SERVICE    Bekah Gómez is a 74 y.o. male on warfarin therapy. Pharmacy consulted by ENEDINA Dumont for monitoring and adjustment of treatment. Indication for anticoagulation: Hx fo PE/DVT, CAD  INR goal: 2-3  Warfarin dose prior to admission: 2mg daily    Pertinent Laboratory Values   Recent Labs     12/12/22  0123 12/12/22  0459   INR  --  1.59   HGB 13.7  --    HCT 41.7*  --      --        Assessment/Plan:  Drug Interactions:   Home meds:  aspirin  No new drug interactions noted. INR subtherapeutic on admission. Noted that patient had held a dose of warfarin for cataract surgery. Give a slightly higher dose of warfarin 3mg today, then look to resume normal home dose of warfarin 2mg daily tomorrow. Pharmacy will continue to monitor and adjust warfarin therapy as indicated    Thank you for the consult. Tawanna Padilla, [facility]  12/12/2022 10:13 AM

## 2022-12-12 NOTE — PROGRESS NOTES
V2.0  [facility] [place]      Name:  Cordelia Langford /Age/Sex: 1955  (74 y.o. male)   MRN & CSN:  [id number] & [id number] Encounter Date/Time: 2022 8:09 AM EST    Location:  [address] [place] [address]-A PCP: [address] Day: 1    Assessment and Plan:   Cordelia Langford is a 74 y.o. male with pmh as noted below who presents with Chest pain      Plan:   Chest pain, rule out ACS  -EKG showed atrial paced rhythm, no acute ST changes  -Troponins negative, continue to trend  -Cardiology consult  --CXR nonacute  --Most recent echo  shows EF 50-55 %     Subtherapeutic INR  --INR 1.5  --Patient recently had cataract surgery and had to hold one dose of Coumadin. Patient reports gets it checked weekly and usually 2.2 range  --Pharmacy to dose Coumadin    Vertigo  -Reports chronic tinnitus 22 -Received meclizine in the ED  -Continue antihistamine as needed  -Outpatient follow-up with ENT     History of bilateral PE  -Status post catheter directed tPA in   -Continue Coumadin  -Follows up with hematology  outpatient     History of left popliteal DVT  -Continue anticoagulation     History of CAD  -Continue aspirin/Lopressor     BPH  -Continue Proscar     Hypertension  -Continue olmesartan 40 mg daily    Diet ADULT DIET; Regular   DVT Prophylaxis [] Lovenox, []  Heparin, [] SCDs, [] Ambulation,  [] Eliquis, [] Xarelto  [x] Coumadin   Code Status Full Code   Disposition From: Home  Expected Disposition: Home  Estimated Date of Discharge: 1-2 days  Patient requires continued admission due to Cardiac workup   Surrogate Decision Maker/ POA Self     Subjective:     Chief Complaint: Chest Pain and Dizziness   Patient seen this AM at bedside, denies pain or dizziness at the moment. Plan of care and labs reviewed. Patient is in agreement. Review of Systems:    Review of Systems   Cardiovascular:  Positive for chest pain.         Lasted \" 1 min, sharp sudden,\"  Not present at the moment Neurological:  Positive for dizziness. Negative for syncope, facial asymmetry, speech difficulty, weakness and numbness. Room spinning sensation followed Chest pain, Denies at the moment   All other systems reviewed and are negative. Objective:   No intake or output data in the 24 hours ending 12/12/22 0809     Vitals:   Vitals:    12/12/22 0451   BP: (!) 155/86   Pulse: 70   Resp:    Temp:    SpO2:        Physical Exam:     General: NAD, not ill appearing  Eyes: EOMI  ENT: neck supple  Cardiovascular:Paced on Tele  Respiratory: Clear to auscultation  Gastrointestinal: Soft, non tender  Genitourinary: no suprapubic tenderness  Musculoskeletal: No edema  Skin: warm, dry  Neuro: Alert. oriented x 4  Psych: Mood appropriate.      Medications:   Medications:    sodium chloride flush  5-40 mL IntraVENous 2 times per day    aspirin  81 mg Oral Daily    finasteride  5 mg Oral Daily    metoprolol tartrate  25 mg Oral BID    pantoprazole  40 mg Oral Daily    warfarin  5 mg Oral Daily    losartan  100 mg Oral Daily      Infusions:    sodium chloride       PRN Meds: sodium chloride flush, 5-40 mL, PRN  sodium chloride, , PRN  ondansetron, 4 mg, Q8H PRN   Or  ondansetron, 4 mg, Q6H PRN  polyethylene glycol, 17 g, Daily PRN  acetaminophen, 650 mg, Q6H PRN   Or  acetaminophen, 650 mg, Q6H PRN        Labs      Recent Results (from the past 24 hour(s))   CBC with Auto Differential    Collection Time: 12/12/22  1:23 AM   Result Value Ref Range    WBC 6.1 4.0 - 10.5 K/CU MM    RBC 4.72 4.6 - 6.2 M/CU MM    Hemoglobin 13.7 13.5 - 18.0 GM/DL    Hematocrit 41.7 (L) 42 - 52 %    MCV 88.3 78 - 100 FL    MCH 29.0 27 - 31 PG    MCHC 32.9 32.0 - 36.0 %    RDW 12.5 11.7 - 14.9 %    Platelets 626 763 - 717 K/CU MM    MPV 10.2 7.5 - 11.1 FL    Differential Type AUTOMATED DIFFERENTIAL     Segs Relative 58.5 36 - 66 %    Lymphocytes % 26.8 24 - 44 %    Monocytes % 9.5 (H) 0 - 4 %    Eosinophils % 4.4 (H) 0 - 3 %    Basophils % 0.5 0 - 1 %    Segs Absolute 3.6 K/CU MM    Lymphocytes Absolute 1.6 K/CU MM    Monocytes Absolute 0.6 K/CU MM    Eosinophils Absolute 0.3 K/CU MM    Basophils Absolute 0.0 K/CU MM    Nucleated RBC % 0.0 %    Total Nucleated RBC 0.0 K/CU MM    Total Immature Neutrophil 0.02 K/CU MM    Immature Neutrophil % 0.3 0 - 0.43 %   Comprehensive Metabolic Panel w/ Reflex to MG    Collection Time: 12/12/22  1:23 AM   Result Value Ref Range    Sodium 139 135 - 145 MMOL/L    Potassium 4.0 3.5 - 5.1 MMOL/L    Chloride 103 99 - 110 mMol/L    CO2 26 21 - 32 MMOL/L    BUN 17 6 - 23 MG/DL    Creatinine 1.1 0.9 - 1.3 MG/DL    Est, Glom Filt Rate >60 >60 mL/min/1.73m2    Glucose 123 (H) 70 - 99 MG/DL    Calcium 8.8 8.3 - 10.6 MG/DL    Albumin 4.0 3.4 - 5.0 GM/DL    Total Protein 6.9 6.4 - 8.2 GM/DL    Total Bilirubin 0.3 0.0 - 1.0 MG/DL    ALT 26 10 - 40 U/L    AST 26 15 - 37 IU/L    Alkaline Phosphatase 54 40 - 128 IU/L    Anion Gap 10 4 - 16   Troponin    Collection Time: 12/12/22  1:23 AM   Result Value Ref Range    Troponin T <0.010 <0.01 NG/ML   EKG 12 Lead    Collection Time: 12/12/22  1:29 AM   Result Value Ref Range    Ventricular Rate 70 BPM    Atrial Rate 70 BPM    P-R Interval 266 ms    QRS Duration 104 ms    Q-T Interval 432 ms    QTc Calculation (Bazett) 466 ms    R Axis 7 degrees    T Axis 25 degrees    Diagnosis       Atrial-paced rhythm with prolonged AV conduction  Abnormal ECG  No previous ECGs available     Protime-INR    Collection Time: 12/12/22  4:59 AM   Result Value Ref Range    Protime 20.6 (H) 11.7 - 14.5 SECONDS    INR 1.59 INDEX        Imaging/Diagnostics Last 24 Hours   CT HEAD WO CONTRAST    Result Date: 12/12/2022  EXAMINATION: CT OF THE HEAD WITHOUT CONTRAST  12/12/2022 2:05 am TECHNIQUE: CT of the head was performed without the administration of intravenous contrast. Automated exposure control, iterative reconstruction, and/or weight based adjustment of the mA/kV was utilized to reduce the radiation dose to as low as reasonably achievable. COMPARISON: 02/02/2015 HISTORY: ORDERING SYSTEM PROVIDED HISTORY: vertigo TECHNOLOGIST PROVIDED HISTORY: Has a \"code stroke\" or \"stroke alert\" been called? ->No Reason for exam:->vertigo Decision Support Exception - unselect if not a suspected or confirmed emergency medical condition->Emergency Medical Condition (MA) Reason for Exam: vertigo FINDINGS: BRAIN/VENTRICLES: No acute intracranial hemorrhage or extra-axial fluid collection. There is no mass effect or midline shift. Focal area of low attenuation in the left lenticular nucleus is unchanged from the previous exam and may represent an old lacunar infarct. Some mild low attenuation is scattered in the white matter which is nonspecific but most commonly from mild chronic ischemic changes. There is no territorial loss of the gray-white matter junction. The insular ribbons are symmetric. The ventricles are not dilated. The suprasellar cistern is intact. The pituitary and sella are unremarkable. ORBITS: The visualized portion of the orbits demonstrate no acute abnormality. SINUSES: The visualized paranasal sinuses and mastoid air cells demonstrate no acute abnormality. SOFT TISSUES/SKULL:  No acute abnormality of the visualized skull or soft tissues. No acute intracranial hemorrhage, mass effect, midline shift, or hydrocephalus. No sign of acute territorial infarct. Suspect some mild chronic ischemic changes in the white matter and has old lacunar infarct along the left lenticular nucleus. XR CHEST PORTABLE    Result Date: 12/12/2022  EXAMINATION: ONE XRAY VIEW OF THE CHEST 12/12/2022 1:42 am COMPARISON: 01/27/2015 HISTORY: ORDERING SYSTEM PROVIDED HISTORY: chest pain TECHNOLOGIST PROVIDED HISTORY: Reason for exam:->chest pain Reason for Exam: chest pain Additional signs and symptoms: chest pain FINDINGS: Cardiomediastinal silhouette is unchanged in size. Left subclavian cardiac pacing device is unchanged.   Small left pleural effusion. No pneumothorax. There is linear left basilar opacity. No acute osseous abnormality. Small left pleural effusion with adjacent linear opacity, atelectasis versus pneumonia.        Electronically signed by PATRICIA Bell CNP on 12/12/2022 at 8:09 AM

## 2022-12-12 NOTE — ED NOTES
ED TO INPATIENT SBAR HANDOFF    Patient Name: Hugo Ross   :  1955  74 y.o. MRN:  [id number]  Preferred Name  Cassidy Rapp  ED Room #:  ED25/ED-25  Family/Caregiver Present yes   Restraints no   Sitter no   Sepsis Risk Score Sepsis Risk Score: 0.79    Situation  Code Status: Full Code . Allergies: Isosorbide nitrate, Statins, Dilaudid [hydromorphone hcl], Limonene, Statins depletion therapy, Hydromorphone, Hydromorphone hcl, and Imdur [isosorbide mononitrate]  Weight: No data found. Arrived from: home  Chief Complaint:   Chief Complaint   Patient presents with    Chest Pain    Dizziness     Hospital Problem/Diagnosis:  Principal Problem:    Chest pain  Resolved Problems:    * No resolved hospital problems. *    Imaging:   CT HEAD WO CONTRAST   Final Result   No acute intracranial hemorrhage, mass effect, midline shift, or   hydrocephalus. No sign of acute territorial infarct. Suspect some mild chronic ischemic   changes in the white matter and has old lacunar infarct along the left   lenticular nucleus. XR CHEST PORTABLE   Final Result   Small left pleural effusion with adjacent linear opacity, atelectasis versus   pneumonia.            Abnormal labs:   Abnormal Labs Reviewed   CBC WITH AUTO DIFFERENTIAL - Abnormal; Notable for the following components:       Result Value    Hematocrit 41.7 (*)     Monocytes % 9.5 (*)     Eosinophils % 4.4 (*)     All other components within normal limits   COMPREHENSIVE METABOLIC PANEL W/ REFLEX TO MG FOR LOW K - Abnormal; Notable for the following components:    Glucose 123 (*)     All other components within normal limits     Critical values: no     Abnormal Assessment Findings:     Background  History:   Past Medical History:   Diagnosis Date    CAD (coronary artery disease)     CHF (congestive heart failure) (HCC)     Depression     Hypertension     PE (pulmonary embolism)     Prinzmetal's angina (HCC)     Prostate CA (HCC)     Prostate enlargement Assessment    Vitals/MEWS: MEWS Score: 1  Level of Consciousness: Alert (0)   Vitals:    12/12/22 0116 12/12/22 0408   BP: (!) 156/94 137/89   Pulse: 80 70   Resp: 11 19   Temp:  97.9 °F (36.6 °C)   TempSrc:  Oral   SpO2: 96% 95%     FiO2 (%): RA  O2 Flow Rate: O2 Device: None (Room air)    Cardiac Rhythm:    Pain Assessment:  [] Verbal [] Casandra Ready Scale  Pain Scale:    Last documented pain score (0-10 scale)    Last documented pain medication administered:   Mental Status: alert  NIH Score:    C-SSRS: Risk of Suicide: No Risk  Bedside swallow:    Dudley Coma Scale (GCS): Nicole Coma Scale  Eye Opening: Spontaneous  Best Verbal Response: Oriented  Best Motor Response: Obeys commands  Nicole Coma Scale Score: 15  Active LDA's:   Peripheral IV 12/12/22 Proximal;Right Forearm (Active)     PO Status: Regular  Pertinent or High Risk Medications/Drips: no   If Yes, please provide details:   Pending Blood Product Administration: no     You may also review the ED PT Care Timeline found under the Summary Nursing Index tab. Recommendation    Pending orders Admission  Plan for Discharge (if known):    Additional Comments:    If any further questions, please call Sending RN at [phone]    Electronically signed by: Electronically signed by Yusef Jane RN on 12/12/2022 at 4:09 AM     Yusef Jane RN  12/12/22 [facility], RN  12/12/22 2095

## 2022-12-12 NOTE — H&P
V2.0  History and Physical      Name:  Jag Anguiano /Age/Sex: 1955  (74 y.o. male)   MRN & CSN:  [id number] & [id number] Encounter Date/Time: 2022 3:44 AM EST   Location:  ED25/ED-25 PCP: [facility] Day: 1    Assessment and Plan:   Jag Anguiano is a 74 y.o. male  who presents with Chest pain    Hospital Problems             Last Modified POA    * (Principal) Chest pain (Chronic) 2022 Yes       1. Chest pain, rule out ACS  -EKG showed atrial paced rhythm, no acute ST changes  -Troponins negative, continue to trend  -Cardiology consult    2. Vertigo  -Reports chronic tinnitus  -Received meclizine in the ED  -Continue antihistamine as needed  -Outpatient follow-up with ENT    3. History of bilateral PE  -Status post catheter directed tPA in   -Continue Coumadin  -Follows up with hematology  outpatient    4. History of left popliteal DVT  -Continue anticoagulation    5. History of CAD  -Continue aspirin/Lopressor    6. BPH  -Continue Proscar    7. Hypertension  -Continue olmesartan 40 mg daily      Disposition:   Current Living situation: Home  Expected Disposition: Home  Estimated D/C: TBD    Diet ADULT DIET; Regular   DVT Prophylaxis [] Lovenox, []  Heparin, [] SCDs, [] Ambulation,  [] Eliquis, [] Xarelto, [] Coumadin   Code Status Full Code   Surrogate Decision Maker/ POA Spouse     History from:     patient    History of Present Illness:     Chief Complaint: Chest pain and dizziness  Jag Anguiano is a 74 y.o. male with pmh of CAD, bilateral PE, hypertension who presents with chest pain. Patient reports that his symptoms started after 11 PM last night as he tried to go to the bathroom with a sudden onset of left-sided, sharp chest pain, 5 out of 10 in intensity lasting for few seconds followed by nausea and 2 episodes nonbloody vomiting.   Patient reports that he went back to bed and started experiencing dizziness with a sensation of the room spinning around him associated with some sweats and clammy feeling. Patient states that he has chronic history of tinnitus. Denies any cough, fever or chills, headaches or palpitation, problems with bowel movements or urination. Review of Systems: Need 10 Elements   Review of Systems    Point ROS reviewed. Negative except as above in the HPI    Objective:   No intake or output data in the 24 hours ending 12/12/22 0344   Vitals:   Vitals:    12/12/22 0116   BP: (!) 156/94   Pulse: 80   Resp: 11   SpO2: 96%       Medications Prior to Admission     Prior to Admission medications    Medication Sig Start Date End Date Taking? Authorizing Provider   metoprolol tartrate (LOPRESSOR) 25 MG tablet  4/6/20   Historical Provider, MD   warfarin (COUMADIN) 1 MG tablet  4/6/20   Historical Provider, MD   olmesartan (BENICAR) 40 MG tablet Take 1 tablet by mouth    Historical Provider, MD   nitroGLYCERIN (NITROSTAT) 0.4 MG SL tablet Nitrostat 0.4 mg sublingual tablet    Historical Provider, MD   Aspirin Buf,CaCarb-MgCarb-MgO, 81 MG TABS aspirin 81 mg tablet   Take 1 tablet every day by oral route as directed for 365 days. Historical Provider, MD   acetaminophen (TYLENOL) 500 MG tablet Take 500 mg by mouth every 6 hours as needed    Historical Provider, MD   ezetimibe (ZETIA) 10 MG tablet Take 1 tablet by mouth 6/18/20   Historical Provider, MD   sildenafil (VIAGRA) 50 MG tablet Viagra 50 mg tablet   TAKE ONE TABLET BY MOUTH 30 MINUTES BEFORE THE ONSET OF SEXUAL INTERCOURSE AS NEEDED FRO 30 DAYS. Historical Provider, MD   CPAP Machine MISC by CPAP route    Historical Provider, MD   ALPRAZolam Анна Cypher) 0.5 MG tablet Take 0.5 mg by mouth 2 times daily    Historical Provider, MD   warfarin (COUMADIN) 5 MG tablet Take 1 tablet by mouth daily. To be started tomorrow  Patient taking differently: Take 5 mg by mouth in the morning. States takes 5 mg 4 times a week and 10mg 3 times a week alternating days.  1/30/15   June Gray MD pantoprazole (PROTONIX) 40 MG tablet Take 1 tablet by mouth daily. 1/30/15   Francine Kaye MD   finasteride (PROSCAR) 5 MG tablet Take 5 mg by mouth daily. Historical Provider, MD   ranolazine (RANEXA) 500 MG SR tablet Take 1 tablet by mouth 2 times daily. 3/28/14   Darby Jacobs MD   metoprolol (LOPRESSOR) 25 MG tablet Take 25 mg by mouth 2 times daily. Historical Provider, MD   amLODIPine (NORVASC) 5 MG tablet Take 1 tablet by mouth daily. 5/21/13   Jaiden Munroe MD   aspirin (ASPIRIN CHILDRENS) 81 MG chewable tablet Take 1 tablet by mouth daily. 10/23/12   Ema Timmons MD       Physical Exam: Need 8 Elements   Physical Exam     General: NAD  Eyes: EOMI  ENT: neck supple  Cardiovascular: Regular rate. Respiratory: Clear to auscultation  Gastrointestinal: Soft, non tender  Genitourinary: no suprapubic tenderness  Musculoskeletal: No edema  Skin: warm, dry  Neuro: Alert. Psych: Mood appropriate. Past Medical History:   PMHx   Past Medical History:   Diagnosis Date    CAD (coronary artery disease)     CHF (congestive heart failure) ([facility] [address]     Depression     Hypertension     PE (pulmonary embolism)     Prinzmetal's angina (HCC)     Prostate CA (HCC)     Prostate enlargement      PSHX:  has a past surgical history that includes hernia repair; Hydrocele surgery; pacemaker placement (8/9/2013); and Appendectomy. Allergies: Allergies   Allergen Reactions    Isosorbide Nitrate Other (See Comments)     Severe headache  And severe HA      Statins Other (See Comments)     \"My muscles deteriorate\" He was hospitalized.    Severe Headache      Dilaudid [Hydromorphone Hcl] Other (See Comments)     hypotension    Limonene Nausea And Vomiting and Other (See Comments)     Break down muscle tissue  Break down muscle tissue      Statins Depletion Therapy Other (See Comments)     Break down muscle tissue    Hydromorphone     Hydromorphone Hcl     Imdur [Isosorbide Mononitrate] Nausea And Vomiting     And severe HA     Fam HX:  family history includes Cancer in his father; Heart Disease in his brother, brother, brother, and father; High Blood Pressure in his brother and father. Soc HX:   Social History     Socioeconomic History    Marital status:    Tobacco Use    Smoking status: Former     Types: Cigarettes     Quit date: 1984     Years since quittin.0    Smokeless tobacco: Never   Substance and Sexual Activity    Alcohol use: No    Drug use: No       Medications:   Medications:    sodium chloride flush  5-40 mL IntraVENous 2 times per day    enoxaparin  40 mg SubCUTAneous Daily      Infusions:    sodium chloride       PRN Meds: sodium chloride flush, 5-40 mL, PRN  sodium chloride, , PRN  ondansetron, 4 mg, Q8H PRN   Or  ondansetron, 4 mg, Q6H PRN  polyethylene glycol, 17 g, Daily PRN  acetaminophen, 650 mg, Q6H PRN   Or  acetaminophen, 650 mg, Q6H PRN        Labs      CBC:   Recent Labs     22   WBC 6.1   HGB 13.7        BMP:    Recent Labs     22      K 4.0      CO2 26   BUN 17   CREATININE 1.1   GLUCOSE 123*     Hepatic:   Recent Labs     22   AST 26   ALT 26   BILITOT 0.3   ALKPHOS 54     Lipids:   Lab Results   Component Value Date/Time    CHOL 202 2015 06:00 AM    HDL 39 2015 06:00 AM    TRIG 136 2015 06:00 AM     Hemoglobin A1C: No results found for: LABA1C  TSH: No results found for: TSH  Troponin:   Lab Results   Component Value Date/Time    TROPONINT <0.010 2022 01:23 AM    TROPONINT <0.010 2015 11:40 PM    TROPONINT <0.010 2015 11:30 AM     Lactic Acid: No results for input(s): LACTA in the last 72 hours. BNP: No results for input(s): PROBNP in the last 72 hours.   UA:  Lab Results   Component Value Date/Time    NITRU NEGATIVE 2020 08:24 PM    COLORU YELLOW 2020 08:24 PM    WBCUA NONE SEEN 2020 08:24 PM    RBCUA NONE SEEN 2020 08:24 PM    MUCUS RARE 2014 01:26 AM TRICHOMONAS NONE SEEN 06/21/2020 08:24 PM    BACTERIA NEGATIVE 06/21/2020 08:24 PM    CLARITYU CLEAR 06/21/2020 08:24 PM    [address] 1.027 06/21/2020 08:24 PM    LEUKOCYTESUR NEGATIVE 06/21/2020 08:24 PM    UROBILINOGEN 1 06/21/2020 08:24 PM    12 Reinaldo Lamb NEGATIVE 06/21/2020 08:24 PM    BLOODU NEGATIVE 06/21/2020 08:24 PM    Martita Larose NEGATIVE 06/21/2020 08:24 PM     Urine Cultures: No results found for: Loreta Soto  Blood Cultures: No results found for: BC  No results found for: BLOODCULT2  Organism: No results found for: ORG    Imaging/Diagnostics Last 24 Hours   CT HEAD WO CONTRAST    Result Date: 12/12/2022  EXAMINATION: CT OF THE HEAD WITHOUT CONTRAST  12/12/2022 2:05 am TECHNIQUE: CT of the head was performed without the administration of intravenous contrast. Automated exposure control, iterative reconstruction, and/or weight based adjustment of the mA/kV was utilized to reduce the radiation dose to as low as reasonably achievable. COMPARISON: 02/02/2015 HISTORY: ORDERING SYSTEM PROVIDED HISTORY: vertigo TECHNOLOGIST PROVIDED HISTORY: Has a \"code stroke\" or \"stroke alert\" been called? ->No Reason for exam:->vertigo Decision Support Exception - unselect if not a suspected or confirmed emergency medical condition->Emergency Medical Condition (MA) Reason for Exam: vertigo FINDINGS: BRAIN/VENTRICLES: No acute intracranial hemorrhage or extra-axial fluid collection. There is no mass effect or midline shift. Focal area of low attenuation in the left lenticular nucleus is unchanged from the previous exam and may represent an old lacunar infarct. Some mild low attenuation is scattered in the white matter which is nonspecific but most commonly from mild chronic ischemic changes. There is no territorial loss of the gray-white matter junction. The insular ribbons are symmetric. The ventricles are not dilated. The suprasellar cistern is intact. The pituitary and sella are unremarkable.  ORBITS: The visualized portion of the orbits demonstrate no acute abnormality. SINUSES: The visualized paranasal sinuses and mastoid air cells demonstrate no acute abnormality. SOFT TISSUES/SKULL:  No acute abnormality of the visualized skull or soft tissues. No acute intracranial hemorrhage, mass effect, midline shift, or hydrocephalus. No sign of acute territorial infarct. Suspect some mild chronic ischemic changes in the white matter and has old lacunar infarct along the left lenticular nucleus. XR CHEST PORTABLE    Result Date: 12/12/2022  EXAMINATION: ONE XRAY VIEW OF THE CHEST 12/12/2022 1:42 am COMPARISON: 01/27/2015 HISTORY: ORDERING SYSTEM PROVIDED HISTORY: chest pain TECHNOLOGIST PROVIDED HISTORY: Reason for exam:->chest pain Reason for Exam: chest pain Additional signs and symptoms: chest pain FINDINGS: Cardiomediastinal silhouette is unchanged in size. Left subclavian cardiac pacing device is unchanged. Small left pleural effusion. No pneumothorax. There is linear left basilar opacity. No acute osseous abnormality. Small left pleural effusion with adjacent linear opacity, atelectasis versus pneumonia.          Electronically signed by Anastacia Sims MD on 12/12/2022 at 3:44 AM

## 2022-12-12 NOTE — CONSULTS
Pt. Seen and full note to follow  Dictated-[id number]  Atypical CP- Hx of coronary spasm in the past  Sharp and short-30 seconds- nonexertional  Stress 11/2021 ne. At office  [facility] 2014 no sig CAD- concern for coronary spasm  Hx of SSS s/p PPM  Hx of b/l PE and Dvt so on warfarin-INR subtherapeutic  Vertigo symptoms as well  Check echo today and repeat one more trop to ensure not elevating  Vertigo treatment per primary    Ok to start diet  Check trop and  decide   Check pacer for arrhythmia  He had sharp pain and then dizziness  He has a Medtronic pacer   Hx of DVT on [facility]    Echo--12/22   Summary    Left ventricular systolic function is normal.    Ejection fraction is visually estimated at 55-60%. Severely dilated right ventricle. PPM wiring visualized within the right heart. No significant valvular disease noted. No evidence of any pericardial effusion. Grade I diastolic dysfunction . Echo--IMPRESSION:  1. Mild left ventricular hypertrophy noted. 2.  Ejection fraction 50% to 55%. 3.  Moderate left atrial enlargement. 4.  Pacer wire on the right side of the heart. 5.  Right ventricle is dilated. 6.  Intraventricular septum is flattened, suggestive of pressure overload. 7.  Moderate pulmonary hypertension noted. Right ventricular systolic  pressure 50 mmHg is noted. 8.  Grade 1 diastolic dysfunction noted.        Electronically signed by Tito Jiang MD on 12/12/22 at 9:34 AM EST      Pacer check  Medtronic device  Mainly atrial pacing   No events noted --some SVT noted-old   Increase metoprolol 50mg bid  Check ortho BP

## 2022-12-12 NOTE — FLOWSHEET NOTE
12/12/22 1528   Vital Signs   Temp 97.9 °F (36.6 °C)   Temp Source Oral   Heart Rate 70   Heart Rate Source Telemetry   Resp 14   /78   MAP (Calculated) 93   MAP (mmHg) 90   BP Location Left upper arm   BP Method Automatic   Patient Position Semi fowlers   Orthostatic B/P and Pulse?  Yes   Blood Pressure Lying 122/78   Pulse Lying 70 PER MINUTE   Blood Pressure Sitting 117/84   Pulse Sitting 79 PER MINUTE   Blood Pressure Standing 116/79   Pulse Standing 74 PER MINUTE   Level of Consciousness 0   MEWS Score 0

## 2022-12-12 NOTE — ED PROVIDER NOTES
Triage Chief Complaint:   Chest Pain and Dizziness    Inaja:  Juvenal Ji is a 74 y.o. male that presents via EMS for chest pain and dizziness. The patient states that he was sitting at home eating Oreos around 11:30 PM when he had onset of sharp left-sided chest pain that lasted for about 30 seconds before resolving. States that he stood up and started walking to his bedroom when he became extremely nauseated and started vomiting. States that he then developed dizziness which he describes as room spinning sensation. States that that feeling has been somewhat intermittent and worse with certain movements and postural changes. States that he has not had this type of dizziness before in the past.  States that occasionally he will still have some dull aching chest pain as well but is more concerned about the dizziness. He received aspirin and nitroglycerin by EMS prior to arrival.  Denies any new vision changes, headaches, motor or sensory deficits. Denies any current chest or abdominal pain. ROS:  At least 10 systems reviewed and otherwise acutely negative except as in the [address]     Past Medical History:   Diagnosis Date    CAD (coronary artery disease)     CHF (congestive heart failure) (HCC)     Depression     Hypertension     PE (pulmonary embolism)     Prinzmetal's angina (HCC)     Prostate CA (HCC)     Prostate enlargement      Past Surgical History:   Procedure Laterality Date    APPENDECTOMY      HERNIA REPAIR      double hernia repair    HYDROCELE EXCISION      PACEMAKER PLACEMENT  8/9/2013     Family History   Problem Relation Age of Onset    Heart Disease Father     Cancer Father     High Blood Pressure Father     Heart Disease Brother     High Blood Pressure Brother     Heart Disease Brother     Heart Disease Brother      Social History     Socioeconomic History    Marital status:      Spouse name: Not on file    Number of children: Not on file    Years of education: Not on file Highest education level: Not on file   Occupational History    Not on file   Tobacco Use    Smoking status: Former     Types: Cigarettes     Quit date: 1984     Years since quittin.0    Smokeless tobacco: Never   Vaping Use    Vaping Use: Not on file   Substance and Sexual Activity    Alcohol use: No    Drug use: No    Sexual activity: Not on file   Other Topics Concern    Not on file   Social History Narrative    Not on file     Social Determinants of Health     Financial Resource Strain: Not on file   Food Insecurity: Not on file   Transportation Needs: Not on file   Physical Activity: Not on file   Stress: Not on file   Social Connections: Not on file   Intimate Partner Violence: Not on file   Housing Stability: Not on file     No current facility-administered medications for this encounter. Current Outpatient Medications   Medication Sig Dispense Refill    metoprolol tartrate (LOPRESSOR) 25 MG tablet       warfarin (COUMADIN) 1 MG tablet       olmesartan (BENICAR) 40 MG tablet Take 1 tablet by mouth      nitroGLYCERIN (NITROSTAT) 0.4 MG SL tablet Nitrostat 0.4 mg sublingual tablet      Aspirin Buf,CaCarb-MgCarb-MgO, 81 MG TABS aspirin 81 mg tablet   Take 1 tablet every day by oral route as directed for 365 days. acetaminophen (TYLENOL) 500 MG tablet Take 500 mg by mouth every 6 hours as needed      ezetimibe (ZETIA) 10 MG tablet Take 1 tablet by mouth      sildenafil (VIAGRA) 50 MG tablet Viagra 50 mg tablet   TAKE ONE TABLET BY MOUTH 30 MINUTES BEFORE THE ONSET OF SEXUAL INTERCOURSE AS NEEDED FRO 30 DAYS. CPAP Machine MISC by CPAP route      ALPRAZolam (XANAX) 0.5 MG tablet Take 0.5 mg by mouth 2 times daily      warfarin (COUMADIN) 5 MG tablet Take 1 tablet by mouth daily. To be started tomorrow (Patient taking differently: Take 5 mg by mouth in the morning. States takes 5 mg 4 times a week and 10mg 3 times a week alternating days. ) 30 tablet 3    pantoprazole (PROTONIX) 40 MG tablet all branches of trigeminal nerve. Full strength and sensation in the bilateral upper and lower extremities. DTRs 2+ in bilateral patella distributions. No dysmetria. No slurred speech. No aphasia. HINTS exam: unidirectional nystagmus, no vertical skew, abnormal head impulse test.  PSYCHIATRIC: Normal mood.     I have reviewed and interpreted all of the currently available lab results from this visit (if applicable):  Results for orders placed or performed during the hospital encounter of 12/12/22   CBC with Auto Differential   Result Value Ref Range    WBC 6.1 4.0 - 10.5 K/CU MM    RBC 4.72 4.6 - 6.2 M/CU MM    Hemoglobin 13.7 13.5 - 18.0 GM/DL    Hematocrit 41.7 (L) 42 - 52 %    MCV 88.3 78 - 100 FL    MCH 29.0 27 - 31 PG    MCHC 32.9 32.0 - 36.0 %    RDW 12.5 11.7 - 14.9 %    Platelets 874 865 - 325 K/CU MM    MPV 10.2 7.5 - 11.1 FL    Differential Type AUTOMATED DIFFERENTIAL     Segs Relative 58.5 36 - 66 %    Lymphocytes % 26.8 24 - 44 %    Monocytes % 9.5 (H) 0 - 4 %    Eosinophils % 4.4 (H) 0 - 3 %    Basophils % 0.5 0 - 1 %    Segs Absolute 3.6 K/CU MM    Lymphocytes Absolute 1.6 K/CU MM    Monocytes Absolute 0.6 K/CU MM    Eosinophils Absolute 0.3 K/CU MM    Basophils Absolute 0.0 K/CU MM    Nucleated RBC % 0.0 %    Total Nucleated RBC 0.0 K/CU MM    Total Immature Neutrophil 0.02 K/CU MM    Immature Neutrophil % 0.3 0 - 0.43 %   Comprehensive Metabolic Panel w/ Reflex to MG   Result Value Ref Range    Sodium 139 135 - 145 MMOL/L    Potassium 4.0 3.5 - 5.1 MMOL/L    Chloride 103 99 - 110 mMol/L    CO2 26 21 - 32 MMOL/L    BUN 17 6 - 23 MG/DL    Creatinine 1.1 0.9 - 1.3 MG/DL    Est, Glom Filt Rate >60 >60 mL/min/1.73m2    Glucose 123 (H) 70 - 99 MG/DL    Calcium 8.8 8.3 - 10.6 MG/DL    Albumin 4.0 3.4 - 5.0 GM/DL    Total Protein 6.9 6.4 - 8.2 GM/DL    Total Bilirubin 0.3 0.0 - 1.0 MG/DL    ALT 26 10 - 40 U/L    AST 26 15 - 37 IU/L    Alkaline Phosphatase 54 40 - 128 IU/L    Anion Gap 10 4 - 16 Troponin   Result Value Ref Range    Troponin T <0.010 <0.01 NG/ML   EKG 12 Lead   Result Value Ref Range    Ventricular Rate 70 BPM    Atrial Rate 70 BPM    P-R Interval 266 ms    QRS Duration 104 ms    Q-T Interval 432 ms    QTc Calculation (Bazett) 466 ms    R Axis 7 degrees    T Axis 25 degrees    Diagnosis       Atrial-paced rhythm with prolonged AV conduction  Abnormal ECG  No previous ECGs available        Radiographs (if obtained):  [] The following radiograph was interpreted by myself in the absence of a radiologist:  [x] Radiologist's Report Reviewed:      EKG (if obtained): (All EKG's are interpreted by myself in the absence of a cardiologist)  Atrial paced rhythm with prolonged AV conduction. Normal axis. No specific ST or T wave changes. No pathologic Q waves. QTC is normal.    MDM:  Plan of care is discussed thoroughly with the patient and family if present. If performed, all imaging and lab work also discussed with patient. All relevant prior results and chart reviewed if available. Patient presents as above. He is in no acute distress. Vital signs are within appropriate ranges. Presents with multiple complaints but having symptoms consistent with peripheral vertigo. He has unidirectional horizontal nystagmus and a reassuring hints exam at this time. He does not have any focal neurologic deficits on exam.  NIH stroke scale is 0. He has having intermittent vertiginous symptoms so stroke alert is not initiated. CT head does not show any evidence of acute abnormality. Patient also having some intermittent chest pain. Denies any active chest pain on arrival.  He does have history of CAD. Aspirin and nitroglycerin given prior to arrival.    At this point, the exact cause of the patient's current chest pain is unknown. Initial EKG is nondiagnostic and initial troponin testing protocols are within normal limits.   At the present time, I doubt pulmonary embolus secondary to the lack of tachycardia, tachypnea, or hypoxia. Similarly, I doubt aortic dissection or abdominal aortic aneurysm secondary to history and description of pain, the patient's nonfocal vascular examination in all four extremities, and CXR unremarkable for signs of mediastinal widening. I also doubt pneumothorax given good bilateral breath sounds and chest x-ray with good lung markings out to the periphery. No signs suggestive of pneumonia on history or physical exam as well. Pericarditis and myocarditis unlikely based on history, EKG findings, and normal cardiac markers. Despite this, I cannot reliably exclude a cardiac etiology here in the emergency department. Therefore, I do feel it is most reasonable to admit the patient for further evaluation, management, and possible provocative testing/further intervention. Patient is agreeable with this plan of care. Clinical Impression:  1. Vertigo    2.  Chest pain, unspecified type      (Please note that portions of this note may have been completed with a voice recognition program. Efforts were made to edit the dictations but occasionally words are mis-transcribed.)    MD Dawood Cartagena MD  12/12/22 3161

## 2022-12-12 NOTE — CARE COORDINATION
CM in to see Pt to initiate discharge planning. Pt from home with his wife and mother in law. Pt states he is independent with ADL's and has  DME to include a cane. Pt denies the need for home care at this time. Pt has insurance, pcp, and can afford medications. Pt denies any needs at this time.      CM following

## 2022-12-13 LAB
EKG ATRIAL RATE: 70 BPM
EKG DIAGNOSIS: NORMAL
EKG P-R INTERVAL: 266 MS
EKG Q-T INTERVAL: 432 MS
EKG QRS DURATION: 104 MS
EKG QTC CALCULATION (BAZETT): 466 MS
EKG R AXIS: 7 DEGREES
EKG T AXIS: 25 DEGREES
EKG VENTRICULAR RATE: 70 BPM

## 2022-12-13 PROCEDURE — 93010 ELECTROCARDIOGRAM REPORT: CPT | Performed by: INTERNAL MEDICINE

## 2022-12-13 NOTE — DISCHARGE SUMMARY
V2.0  Discharge Summary    Name:  Fantasma Arizmendi /Age/Sex: 1955 (20 y.o. male)   Admit Date: 2022  Discharge Date: 22    MRN & CSN:  [id number] & [id number] Encounter Date and Time 22 9:34 PM EST    Attending:  Destini Short MD Discharging Provider: Jodi Morris [address] [facility] Course:     Brief HPI: Fantasma Arizmendi is a 74 y.o. male who presented with Cehst pain and dizziness    Brief Problem Based Course:   Chest pain, rule out ACS  -EKG showed atrial paced rhythm, no acute ST changes  -Troponins negative x 2  -Cardiology following, patient should follow-up with outpatient. --CXR nonacute  --Most recent echo  shows EF 50-55 %; 2D echo completed today shows stable EF at 50-55%, with grade 1 diastolic dysfunction. Subtherapeutic INR  --INR 1.5  --Patient recently had cataract surgery and had to hold one dose of Coumadin. Patient reports gets it checked weekly and usually 2.2 range  -- Follow-up with weekly testing as outpatient as per previous outpatient plan     Vertigo  -Reports chronic tinnitus 22 -Received meclizine in the ED  -Continue antihistamine as needed  -Outpatient follow-up with ENT  -- CT head is \"non-acute for intracranial hemorrhage, mass-effect, mid line shift, or hydrocephalus. No sign of acute territorial infarct. Suspect some mild chronic ischemic changes in the white matter and has an old lacunar infarct along the left lenticular nucleus. \"     History of bilateral PE  -Status post catheter directed tPA in   -Continue Coumadin  -Follows up with hematology  outpatient     History of left popliteal DVT  -Continue anticoagulation     History of CAD  -Continue aspirin/Lopressor     BPH  -Continue Proscar     Hypertension  -Continue BB, Norvasc, add Cozaar    The patient expressed appropriate understanding of, and agreement with the discharge recommendations, medications, and plan.      Consults this admission:  IP CONSULT TO ASK your doctor about these medications      aspirin 81 MG chewable tablet  Commonly known as: Aspirin Childrens  Take 1 tablet by mouth daily. Aspirin Buf(CaCarb-MgCarb-MgO) 81 MG Tabs     finasteride 5 MG tablet  Commonly known as: PROSCAR               Where to Get Your Medications        These medications were sent to [place] [address] [id number], [address] [phone] Liz Dennissen [address], [address] [place] [place] [phone]      Phone: [phone]   losartan 100 MG tablet  metoprolol tartrate 50 MG tablet       Information about where to get these medications is not yet available    Ask your nurse or doctor about these medications  pantoprazole 40 MG tablet        Objective Findings at Discharge:   /78   Pulse 70   Temp 97.9 °F (36.6 °C) (Oral)   Resp 14   Ht 6' 1\" (1.854 m)   Wt 292 lb (132.5 kg)   SpO2 97%   BMI 38.52 kg/m²       Physical Exam:   General: NAD  Eyes: EOMI  ENT: neck supple  Cardiovascular: Paced  Respiratory: Clear to auscultation  Gastrointestinal: Soft, non tender  Genitourinary: no suprapubic tenderness  Musculoskeletal: No edema  Skin: warm, dry  Neuro: Alert. and Oriented x 4  Psych: Mood appropriate. Labs and Imaging   CT HEAD WO CONTRAST    Result Date: 12/12/2022  EXAMINATION: CT OF THE HEAD WITHOUT CONTRAST  12/12/2022 2:05 am TECHNIQUE: CT of the head was performed without the administration of intravenous contrast. Automated exposure control, iterative reconstruction, and/or weight based adjustment of the mA/kV was utilized to reduce the radiation dose to as low as reasonably achievable. COMPARISON: 02/02/2015 HISTORY: ORDERING SYSTEM PROVIDED HISTORY: vertigo TECHNOLOGIST PROVIDED HISTORY: Has a \"code stroke\" or \"stroke alert\" been called? ->No Reason for exam:->vertigo Decision Support Exception - unselect if not a suspected or confirmed emergency medical condition->Emergency Medical Condition (MA) Reason for Exam: vertigo FINDINGS: BRAIN/VENTRICLES: No acute intracranial hemorrhage or extra-axial fluid collection. There is no mass effect or midline shift. Focal area of low attenuation in the left lenticular nucleus is unchanged from the previous exam and may represent an old lacunar infarct. Some mild low attenuation is scattered in the white matter which is nonspecific but most commonly from mild chronic ischemic changes. There is no territorial loss of the gray-white matter junction. The insular ribbons are symmetric. The ventricles are not dilated. The suprasellar cistern is intact. The pituitary and sella are unremarkable. ORBITS: The visualized portion of the orbits demonstrate no acute abnormality. SINUSES: The visualized paranasal sinuses and mastoid air cells demonstrate no acute abnormality. SOFT TISSUES/SKULL:  No acute abnormality of the visualized skull or soft tissues. No acute intracranial hemorrhage, mass effect, midline shift, or hydrocephalus. No sign of acute territorial infarct. Suspect some mild chronic ischemic changes in the white matter and has old lacunar infarct along the left lenticular nucleus. XR CHEST PORTABLE    Result Date: 12/12/2022  EXAMINATION: ONE XRAY VIEW OF THE CHEST 12/12/2022 1:42 am COMPARISON: 01/27/2015 HISTORY: ORDERING SYSTEM PROVIDED HISTORY: chest pain TECHNOLOGIST PROVIDED HISTORY: Reason for exam:->chest pain Reason for Exam: chest pain Additional signs and symptoms: chest pain FINDINGS: Cardiomediastinal silhouette is unchanged in size. Left subclavian cardiac pacing device is unchanged. Small left pleural effusion. No pneumothorax. There is linear left basilar opacity. No acute osseous abnormality. Small left pleural effusion with adjacent linear opacity, atelectasis versus pneumonia.        CBC:   Recent Labs     12/12/22  0123   WBC 6.1   HGB 13.7        BMP:    Recent Labs     12/12/22  0123      K 4.0      CO2 26   BUN 17 CREATININE 1.1   GLUCOSE 123*     Hepatic:   Recent Labs     12/12/22  0123   AST 26   ALT 26   BILITOT 0.3   ALKPHOS 54     Lipids:   Lab Results   Component Value Date/Time    CHOL 192 12/12/2022 09:35 AM    HDL 39 12/12/2022 09:35 AM    TRIG 129 12/12/2022 09:35 AM     Hemoglobin A1C: No results found for: LABA1C  TSH: No results found for: TSH  Troponin:   Lab Results   Component Value Date/Time    TROPONINT <0.010 12/12/2022 09:35 AM    TROPONINT <0.010 12/12/2022 01:23 AM    TROPONINT <0.010 01/26/2015 11:40 PM     Lactic Acid: No results for input(s): LACTA in the last 72 hours. BNP: No results for input(s): PROBNP in the last 72 hours.   UA:  Lab Results   Component Value Date/Time    NITRU NEGATIVE 06/21/2020 08:24 PM    COLORU YELLOW 06/21/2020 08:24 PM    WBCUA NONE SEEN 06/21/2020 08:24 PM    RBCUA NONE SEEN 06/21/2020 08:24 PM    MUCUS RARE 08/17/2014 01:26 AM    TRICHOMONAS NONE SEEN 06/21/2020 08:24 PM    BACTERIA NEGATIVE 06/21/2020 08:24 PM    CLARITYU CLEAR 06/21/2020 08:24 PM    SPECGRAV 1.027 06/21/2020 08:24 PM    LEUKOCYTESUR NEGATIVE 06/21/2020 08:24 PM    UROBILINOGEN 1 06/21/2020 08:24 PM    BILIRUBINUR NEGATIVE 06/21/2020 08:24 PM    BLOODU NEGATIVE 06/21/2020 08:24 PM    Linward Corns NEGATIVE 06/21/2020 08:24 PM     Urine Cultures: No results found for: LABURIN  Blood Cultures: No results found for: BC  No results found for: BLOODCULT2  Organism: No results found for: ORG    Time Spent Discharging patient 35 minutes    Electronically signed by PATRICIA Ramirez CNP on 12/12/2022 at 9:34 PM

## 2022-12-13 NOTE — CONSULTS
[address] [place] [address], [address] [facility]                                  CONSULTATION    PATIENT NAME: Fermin Rojas                    :        1955  MED REC NO:   [id number]                          ROOM:       [id number]  ACCOUNT NO:   [de-identified]                           ADMIT DATE: 2022  PROVIDER:     ALEAH Cleveland    CONSULT DATE:  2022    CHIEF COMPLAINT:  Chest pain. HISTORY OF PRESENT ILLNESS:  This is a 69-year-old male with a past  medical history of coronary spasm and bilateral PE and DVT, on Coumadin,  who apparently last night sitting in his chair around 11 p.m., watching  TV and eating Oreo started to have acute left-sided sharp stabbing chest  pain about 4-5/10 in intensity. He states it did not radiate anywhere. It did not seem to be provoked by anything, was not worse with  breathing. He does not have any history of acid reflux and was not  worse with palpation, per the patient. It lasted about 30 seconds and  then resolved on its own. It was not associated with shortness of  breath or diaphoresis; however, shortly after he had the chest pain, he  started to feel very dizzy. [place] like the room was spinning around him  and he got nauseated and vomited. He decided to come to the ER for  further evaluation after that. PAST MEDICAL HISTORY:  CHF, depression, hypertension, PE, angina, and  prostate cancer. PAST SURGICAL HISTORY:  Hydrocele surgery, pacemaker placement in   for sick sinus syndrome, and appendectomy. ALLERGIES:  IMDUR, STATINS, DILAUDID, LEMONADE, and HYDROMORPHONE.    HOME MEDICATIONS:  Lopressor 25 mg b.i.d., Coumadin, Benicar 20 mg  daily, nitro, aspirin 81 mg daily, Tylenol _____, Zetia, Viagra, Xanax,  Protonix, Ranexa 500 mg b.i.d., Norvasc 5 mg daily. FAMILY HISTORY:  Reviewed and noncontributory. SOCIAL HISTORY:  Former smoker, quit in .   Does not drink any  alcohol. REVIEW OF SYSTEMS:  10-point review of systems is reviewed and is  negative unless noted in the HPI. PHYSICAL EXAMINATION:  GENERAL:  Awake and alert male, sitting up in bed, in no acute distress. HEENT:  Head:  Atraumatic and normocephalic. Eyes:  No scleral  erythema, discharge, or conjunctivitis noted. NECK:  Trachea is midline. No apparent mass. CARDIAC:  Regular rate and rhythm. No murmurs, rubs, or gallops  auscultated. LUNGS:  Clear to auscultation bilaterally. Good rise and fall of chest.  ABDOMEN:  Soft and nontender. MUSCULOSKELETAL:  No obvious joint deformities. SKIN:  No erythema or ecchymosis noted. NEUROLOGIC:  Alert and oriented x4. PSYCHIATRIC:  Normal mood and affect. IMAGING DATA:  Chest x-ray was done that showed a small left pleural  effusion with adjacent linear opacity or atelectasis versus pneumonia. CT head was done that showed no acute hemorrhage, mass effect, midline  shift, or hydrocephalus. No evidence of acute infarct but mild chronic  ischemic changes in the white matter and old lacunar infarct noted as  well. EKG was done that shows an A-paced rhythm but no significant ST  abnormalities noted. Last stress test was done in 11/03/2021. It  showed EF of 55% with no ischemia noted. Last echo was on 11/04/2021. EF was preserved with mild-to-moderate MRI and mild AI. RVSP 23 _____  noted. Last heart catheterization was done in 2014. No significant  coronary artery disease noted, only mild luminal irregularities noted in  the LAD, OM1, and RCA. Everything else had no disease at all. There  was a concern for coronary spasm noted during that procedure. LABORATORY DATA:  Troponin is negative x1. BNP within normal limits. LFTs within normal limits. CBC within normal limits. IMPRESSION AND PLAN:  This is a 73-year-old male that comes in with  atypical chest pain and vertigo-type symptoms. EKG is fairly benign  overall.   His first troponin is negative. We will repeat another  troponin level for the trend. If it remains negative then it does not  appear to be ACS. We will check an echocardiogram as well and compare  to the previous for wall motion abnormalities and EF reduction. Fairly  atypical pain, does look like it is cardiogenic at this time. Further  treatment dictated by hospital course.     Agree with above       ALEAH Perdomo    D: 12/12/2022 9:27:55       T: 12/12/2022 15:41:33     [email or web address]  Job#: [id number]     Doc#: [id number]    CC:  Marli Chappell MD

## 2023-09-01 ENCOUNTER — OFFICE VISIT (OUTPATIENT)
Dept: ONCOLOGY | Age: 68
End: 2023-09-01
Payer: MEDICARE

## 2023-09-01 ENCOUNTER — HOSPITAL ENCOUNTER (OUTPATIENT)
Dept: INFUSION THERAPY | Age: 68
Discharge: HOME | End: 2023-09-01
Payer: MEDICARE

## 2023-09-01 VITALS
OXYGEN SATURATION: 97 % | BODY MASS INDEX: 39.71 KG/M2 | DIASTOLIC BLOOD PRESSURE: 88 MMHG | WEIGHT: 299.6 LBS | TEMPERATURE: 97.6 F | SYSTOLIC BLOOD PRESSURE: 156 MMHG | HEART RATE: 76 BPM | HEIGHT: 73 IN

## 2023-09-01 DIAGNOSIS — I26.99 OTHER PULMONARY EMBOLISM WITHOUT ACUTE COR PULMONALE, UNSPECIFIED CHRONICITY (HCC): Primary | ICD-10-CM

## 2023-09-01 PROCEDURE — 3079F DIAST BP 80-89 MM HG: CPT | Performed by: INTERNAL MEDICINE

## 2023-09-01 PROCEDURE — 1123F ACP DISCUSS/DSCN MKR DOCD: CPT | Performed by: INTERNAL MEDICINE

## 2023-09-01 PROCEDURE — 99213 OFFICE O/P EST LOW 20 MIN: CPT | Performed by: INTERNAL MEDICINE

## 2023-09-01 PROCEDURE — 99211 OFF/OP EST MAY X REQ PHY/QHP: CPT

## 2023-09-01 PROCEDURE — 3077F SYST BP >= 140 MM HG: CPT | Performed by: INTERNAL MEDICINE

## 2023-09-01 ASSESSMENT — PATIENT HEALTH QUESTIONNAIRE - PHQ9
SUM OF ALL RESPONSES TO PHQ QUESTIONS 1-9: 0
1. LITTLE INTEREST OR PLEASURE IN DOING THINGS: 0
2. FEELING DOWN, DEPRESSED OR HOPELESS: 0
SUM OF ALL RESPONSES TO PHQ9 QUESTIONS 1 & 2: 0
SUM OF ALL RESPONSES TO PHQ QUESTIONS 1-9: 0

## 2023-09-01 NOTE — PROGRESS NOTES
Patient Name: Clint Ferguson  Patient : 1955  Patient MRN: [id number]     Primary Oncologist: Tenisha Garcia MD  Referring Provider: Yury Frank DO     Date of Service: 2023      Chief Complaint:   Chief Complaint   Patient presents with    Follow-up       Patient Active Problem List:     Chest pain     Systolic CHF, chronic ([address])     CAD (coronary artery disease)     HTN (hypertension), benign     Unstable angina ([address])     Pulmonary emboli ([address])     Obstructive sleep apnea    HPI:   Mr. Rosalinda Chu is a very pleasant 68-year-old gentleman with medical history significant for hypertension, non-obstructive coronary artery disease, congestive heart failure, angina, depression, history of prostate cancer originally presented to [facility] on 2015, with chest pain. He was found to have extensive bilateral pulmonary emboli, right greater than left involving the upper and the lower lobe, and segmental pulmonary arteries on CT angiogram of the chest done on 2015. The lower extremities Doppler was also done and it showed occlusive and non-occlusive DVT in the proximal left popliteal through common femoral veins. He received catheter-directed TPA on 2015. I was called at that time for evaluation of his venous thromboembolisms. He stated that his younger brother lost his legs because of blood clot in his 45s and one of his sisters has a blood clot in her eyes. No other family history of blood clot disease. Hypercoagulable workups done on 2015, showed mild elevation in the factor VIII activity (241), negative prothrombin gene mutation, negative factor V Leiden mutation, negative lupus anticoagulant, negative anticardiolipin antibodies, and negative Beta-2 glycoprotein antibodies. We plan to have protein-C, protein-S, and antithrombin III levels at the end of the anticoagulation therapy.        Since Mr. Rosalinda Chu has unprovoked deep

## 2023-09-01 NOTE — PROGRESS NOTES
MA Rooming Questions  Patient: Jamaica Green  MRN: [id number]    Date: 9/1/2023        1. Do you have any new issues?   no         2. Do you need any refills on medications?    no    3. Have you had any imaging done since your last visit?   no    4. Have you been hospitalized or seen in the emergency room since your last visit here?   no    5. Did the patient have a depression screening completed today?  Yes    PHQ-9 Total Score: 0 (9/1/2023 11:37 AM)       PHQ-9 Given to (if applicable):               PHQ-9 Score (if applicable):                     [] Positive     [x]  Negative              Does question #9 need addressed (if applicable)                     [] Yes    []  No               Darnell Arana CMA

## 2024-09-09 ENCOUNTER — TELEPHONE (OUTPATIENT)
Dept: ONCOLOGY | Age: 69
End: 2024-09-09

## 2024-10-15 ENCOUNTER — HOSPITAL ENCOUNTER (OUTPATIENT)
Dept: INFUSION THERAPY | Age: 69
Discharge: HOME | End: 2024-10-15
Payer: MEDICARE

## 2024-10-15 ENCOUNTER — OFFICE VISIT (OUTPATIENT)
Dept: ONCOLOGY | Age: 69
End: 2024-10-15
Payer: MEDICARE

## 2024-10-15 VITALS
BODY MASS INDEX: 40.77 KG/M2 | HEIGHT: 72 IN | DIASTOLIC BLOOD PRESSURE: 88 MMHG | SYSTOLIC BLOOD PRESSURE: 168 MMHG | HEART RATE: 81 BPM | WEIGHT: 301 LBS | OXYGEN SATURATION: 93 % | TEMPERATURE: 97.7 F

## 2024-10-15 DIAGNOSIS — I26.99 OTHER PULMONARY EMBOLISM WITHOUT ACUTE COR PULMONALE, UNSPECIFIED CHRONICITY (HCC): Primary | ICD-10-CM

## 2024-10-15 DIAGNOSIS — I10 HTN (HYPERTENSION), BENIGN: ICD-10-CM

## 2024-10-15 DIAGNOSIS — I26.99 OTHER PULMONARY EMBOLISM WITHOUT ACUTE COR PULMONALE, UNSPECIFIED CHRONICITY (HCC): ICD-10-CM

## 2024-10-15 LAB
ALBUMIN SERPL-MCNC: 4.1 G/DL (ref 3.4–5)
ALBUMIN/GLOB SERPL: 1.4 {RATIO} (ref 1.1–2.2)
ALP SERPL-CCNC: 63 U/L (ref 40–129)
ALT SERPL-CCNC: 25 U/L (ref 10–40)
ANION GAP SERPL CALCULATED.3IONS-SCNC: 9 MMOL/L (ref 9–17)
AST SERPL-CCNC: 28 U/L (ref 15–37)
BASOPHILS # BLD: 0.03 K/UL
BASOPHILS NFR BLD: 0 % (ref 0–1)
BILIRUB SERPL-MCNC: 0.6 MG/DL (ref 0–1)
BUN SERPL-MCNC: 19 MG/DL (ref 7–20)
CALCIUM SERPL-MCNC: 9.6 MG/DL (ref 8.3–10.6)
CHLORIDE SERPL-SCNC: 102 MMOL/L (ref 99–110)
CHOLEST SERPL-MCNC: 174 MG/DL (ref 125–199)
CO2 SERPL-SCNC: 28 MMOL/L (ref 21–32)
CREAT SERPL-MCNC: 1.1 MG/DL (ref 0.8–1.3)
EOSINOPHIL # BLD: 0.25 K/UL
EOSINOPHILS RELATIVE PERCENT: 4 % (ref 0–3)
ERYTHROCYTE [DISTWIDTH] IN BLOOD BY AUTOMATED COUNT: 13.6 % (ref 11.7–14.9)
GFR, ESTIMATED: 66 ML/MIN/1.73M2
GLUCOSE SERPL-MCNC: 102 MG/DL (ref 74–99)
HCT VFR BLD AUTO: 45.6 % (ref 42–52)
HDLC SERPL-MCNC: 45 MG/DL
HGB BLD-MCNC: 14.8 G/DL (ref 13.5–18)
LDLC SERPL CALC-MCNC: 108 MG/DL
LYMPHOCYTES NFR BLD: 1.43 K/UL
LYMPHOCYTES RELATIVE PERCENT: 21 % (ref 24–44)
MCH RBC QN AUTO: 29.5 PG (ref 27–31)
MCHC RBC AUTO-ENTMCNC: 32.5 G/DL (ref 32–36)
MCV RBC AUTO: 90.8 FL (ref 78–100)
MONOCYTES NFR BLD: 0.69 K/UL
MONOCYTES NFR BLD: 10 % (ref 0–4)
NEUTROPHILS NFR BLD: 64 % (ref 36–66)
NEUTS SEG NFR BLD: 4.28 K/UL
PLATELET # BLD AUTO: 198 K/UL (ref 140–440)
PMV BLD AUTO: 9.7 FL (ref 7.5–11.1)
POTASSIUM SERPL-SCNC: 4.5 MMOL/L (ref 3.5–5.1)
PROT SERPL-MCNC: 7 G/DL (ref 6.4–8.2)
RBC # BLD AUTO: 5.02 M/UL (ref 4.6–6.2)
SODIUM SERPL-SCNC: 138 MMOL/L (ref 136–145)
TRIGL SERPL-MCNC: 106 MG/DL
WBC OTHER # BLD: 6.7 K/UL (ref 4–10.5)

## 2024-10-15 PROCEDURE — 85025 COMPLETE CBC W/AUTO DIFF WBC: CPT

## 2024-10-15 PROCEDURE — G8484 FLU IMMUNIZE NO ADMIN: HCPCS | Performed by: INTERNAL MEDICINE

## 2024-10-15 PROCEDURE — G8427 DOCREV CUR MEDS BY ELIG CLIN: HCPCS | Performed by: INTERNAL MEDICINE

## 2024-10-15 PROCEDURE — 99213 OFFICE O/P EST LOW 20 MIN: CPT | Performed by: INTERNAL MEDICINE

## 2024-10-15 PROCEDURE — 99211 OFF/OP EST MAY X REQ PHY/QHP: CPT

## 2024-10-15 PROCEDURE — 80053 COMPREHEN METABOLIC PANEL: CPT

## 2024-10-15 PROCEDURE — G8417 CALC BMI ABV UP PARAM F/U: HCPCS | Performed by: INTERNAL MEDICINE

## 2024-10-15 PROCEDURE — 3079F DIAST BP 80-89 MM HG: CPT | Performed by: INTERNAL MEDICINE

## 2024-10-15 PROCEDURE — 1036F TOBACCO NON-USER: CPT | Performed by: INTERNAL MEDICINE

## 2024-10-15 PROCEDURE — 3077F SYST BP >= 140 MM HG: CPT | Performed by: INTERNAL MEDICINE

## 2024-10-15 PROCEDURE — 1123F ACP DISCUSS/DSCN MKR DOCD: CPT | Performed by: INTERNAL MEDICINE

## 2024-10-15 PROCEDURE — 80061 LIPID PANEL: CPT

## 2024-10-15 PROCEDURE — 3017F COLORECTAL CA SCREEN DOC REV: CPT | Performed by: INTERNAL MEDICINE

## 2024-10-15 PROCEDURE — 36415 COLL VENOUS BLD VENIPUNCTURE: CPT

## 2024-10-15 ASSESSMENT — PATIENT HEALTH QUESTIONNAIRE - PHQ9
SUM OF ALL RESPONSES TO PHQ QUESTIONS 1-9: 0
SUM OF ALL RESPONSES TO PHQ QUESTIONS 1-9: 0
SUM OF ALL RESPONSES TO PHQ9 QUESTIONS 1 & 2: 0
SUM OF ALL RESPONSES TO PHQ QUESTIONS 1-9: 0
SUM OF ALL RESPONSES TO PHQ QUESTIONS 1-9: 0
1. LITTLE INTEREST OR PLEASURE IN DOING THINGS: NOT AT ALL
2. FEELING DOWN, DEPRESSED OR HOPELESS: NOT AT ALL

## 2024-10-15 NOTE — PROGRESS NOTES
MA Rooming Questions  Patient: Jason Gomez  MRN: [id number]    Date: 10/15/2024        1. Do you have any new issues?   no         2. Do you need any refills on medications?    no    3. Have you had any imaging done since your last visit?   no    4. Have you been hospitalized or seen in the emergency room since your last visit here?   no    5. Did the patient have a depression screening completed today? Yes    PHQ-9 Total Score: 0 (10/15/2024 11:51 AM)       PHQ-9 Given to (if applicable):               PHQ-9 Score (if applicable):                     [] Positive     [x]  Negative              Does question #9 need addressed (if applicable)                     [] Yes    []  No               Francine Mcguire CMA      
on anticoagulation therapy, I will continue to follow him on a yearly basis. I will request labs today and will f/u with the results.      Health maintenance - I recommend him to have age appropriate cancer screening, exercise and low sodium diet.     I answered all his questions and concerns for today. Recent imaging and labs were reviewed and discussed with the patient.

## 2024-10-16 ENCOUNTER — CLINICAL DOCUMENTATION (OUTPATIENT)
Dept: ONCOLOGY | Age: 69
End: 2024-10-16

## 2024-10-16 NOTE — PROGRESS NOTES
This nurse called the patient @ [phone] to review lab results. Patient was notified of that his labs results are WNL including his lipid panel at this time. Patient verbalized understanding and denies further needs at this time.

## 2024-11-25 ENCOUNTER — TELEPHONE (OUTPATIENT)
Dept: ONCOLOGY | Age: 69
End: 2024-11-25

## 2024-11-25 NOTE — TELEPHONE ENCOUNTER
Physician advised that patient can stop anticoagulant. Attempted to call patient @ [phone] unsuccessfully. Unable to leave . Automated message states that \"the subscriber you have dialed is not in service. If you feel you have reached this message in error, please hang up and dial again\". Will try back at later time.

## 2024-11-25 NOTE — TELEPHONE ENCOUNTER
Patient called and states he was to stop blood thinners after he retired from .  He is now retired and would like to know if okay to stop blood thinners.

## 2024-11-26 NOTE — TELEPHONE ENCOUNTER
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